# Patient Record
Sex: FEMALE | Race: WHITE | Employment: OTHER | URBAN - METROPOLITAN AREA
[De-identification: names, ages, dates, MRNs, and addresses within clinical notes are randomized per-mention and may not be internally consistent; named-entity substitution may affect disease eponyms.]

---

## 2017-11-13 ENCOUNTER — OP HISTORICAL/CONVERTED ENCOUNTER (OUTPATIENT)
Dept: OTHER | Age: 67
End: 2017-11-13

## 2017-11-17 ENCOUNTER — OP HISTORICAL/CONVERTED ENCOUNTER (OUTPATIENT)
Dept: OTHER | Age: 67
End: 2017-11-17

## 2018-07-23 ENCOUNTER — OP HISTORICAL/CONVERTED ENCOUNTER (OUTPATIENT)
Dept: OTHER | Age: 68
End: 2018-07-23

## 2019-05-24 ENCOUNTER — ED HISTORICAL/CONVERTED ENCOUNTER (OUTPATIENT)
Dept: OTHER | Age: 69
End: 2019-05-24

## 2020-11-18 ENCOUNTER — HOSPITAL ENCOUNTER (OUTPATIENT)
Age: 70
Setting detail: OBSERVATION
Discharge: HOME | End: 2020-11-19
Attending: EMERGENCY MEDICINE | Admitting: INTERNAL MEDICINE
Payer: MEDICARE

## 2020-11-18 ENCOUNTER — APPOINTMENT (OUTPATIENT)
Dept: GENERAL RADIOLOGY | Age: 70
End: 2020-11-18
Attending: EMERGENCY MEDICINE
Payer: MEDICARE

## 2020-11-18 DIAGNOSIS — R07.89 ATYPICAL CHEST PAIN: ICD-10-CM

## 2020-11-18 DIAGNOSIS — R07.9 ACUTE CHEST PAIN: Primary | ICD-10-CM

## 2020-11-18 PROBLEM — J44.9 CHRONIC OBSTRUCTIVE LUNG DISEASE (HCC): Status: ACTIVE | Noted: 2019-10-08

## 2020-11-18 PROBLEM — J30.9 ALLERGIC RHINITIS: Status: ACTIVE | Noted: 2019-10-08

## 2020-11-18 PROBLEM — I49.5 SICK SINUS SYNDROME (HCC): Status: ACTIVE | Noted: 2019-10-08

## 2020-11-18 PROBLEM — E78.5 DYSLIPIDEMIA: Status: ACTIVE | Noted: 2019-10-08

## 2020-11-18 PROBLEM — G47.33 OBSTRUCTIVE SLEEP APNEA OF ADULT: Status: ACTIVE | Noted: 2019-10-08

## 2020-11-18 PROBLEM — I10 ESSENTIAL HYPERTENSION: Status: ACTIVE | Noted: 2019-10-08

## 2020-11-18 PROBLEM — K21.9 GASTROESOPHAGEAL REFLUX DISEASE WITHOUT ESOPHAGITIS: Status: ACTIVE | Noted: 2019-10-08

## 2020-11-18 PROBLEM — E66.9 OBESITY: Status: ACTIVE | Noted: 2019-10-08

## 2020-11-18 LAB
ANION GAP SERPL CALC-SCNC: 2 MMOL/L (ref 5–15)
ANION GAP SERPL CALC-SCNC: 6 MMOL/L (ref 5–15)
APTT PPP: 29.6 SEC (ref 23–35.7)
ATRIAL RATE: 88 BPM
BASOPHILS # BLD: 0 K/UL (ref 0–0.1)
BASOPHILS NFR BLD: 0 % (ref 0–1)
BUN SERPL-MCNC: 12 MG/DL (ref 6–20)
BUN SERPL-MCNC: 9 MG/DL (ref 6–20)
BUN/CREAT SERPL: 16 (ref 12–20)
BUN/CREAT SERPL: 22 (ref 12–20)
CA-I BLD-MCNC: 8.2 MG/DL (ref 8.5–10.1)
CALCULATED P AXIS, ECG09: 68 DEGREES
CALCULATED R AXIS, ECG10: 44 DEGREES
CALCULATED T AXIS, ECG11: 36 DEGREES
CHLORIDE SERPL-SCNC: 111 MMOL/L (ref 97–108)
CHLORIDE SERPL-SCNC: 117 MMOL/L (ref 97–108)
CHOLEST SERPL-MCNC: 122 MG/DL
CO2 SERPL-SCNC: 24 MMOL/L (ref 21–32)
CO2 SERPL-SCNC: 28 MMOL/L (ref 21–32)
CREAT SERPL-MCNC: 0.54 MG/DL (ref 0.55–1.02)
CREAT SERPL-MCNC: 0.57 MG/DL (ref 0.55–1.02)
DIAGNOSIS, 93000: NORMAL
DIFFERENTIAL METHOD BLD: ABNORMAL
EOSINOPHIL # BLD: 0.2 K/UL (ref 0–0.4)
EOSINOPHIL NFR BLD: 2 % (ref 0–7)
ERYTHROCYTE [DISTWIDTH] IN BLOOD BY AUTOMATED COUNT: 14.3 % (ref 11.5–14.5)
ERYTHROCYTE [DISTWIDTH] IN BLOOD BY AUTOMATED COUNT: 14.3 % (ref 11.5–14.5)
GLUCOSE SERPL-MCNC: 90 MG/DL (ref 65–100)
GLUCOSE SERPL-MCNC: 99 MG/DL (ref 65–100)
HCT VFR BLD AUTO: 29.9 % (ref 35–47)
HCT VFR BLD AUTO: 31.9 % (ref 35–47)
HDLC SERPL-MCNC: 23 MG/DL
HDLC SERPL: 5.3 {RATIO} (ref 0–5)
HGB BLD-MCNC: 10 G/DL (ref 11.5–16)
HGB BLD-MCNC: 9.6 G/DL (ref 11.5–16)
IMM GRANULOCYTES # BLD AUTO: 0 K/UL (ref 0–0.04)
IMM GRANULOCYTES NFR BLD AUTO: 0 % (ref 0–0.5)
INR PPP: 1 (ref 0.9–1.1)
LDLC SERPL CALC-MCNC: 47.2 MG/DL (ref 0–100)
LIPID PROFILE,FLP: ABNORMAL
LYMPHOCYTES # BLD: 2.3 K/UL (ref 0.8–3.5)
LYMPHOCYTES NFR BLD: 24 % (ref 12–49)
MAGNESIUM SERPL-MCNC: 2.2 MG/DL (ref 1.6–2.4)
MCH RBC QN AUTO: 32.3 PG (ref 26–34)
MCH RBC QN AUTO: 33 PG (ref 26–34)
MCHC RBC AUTO-ENTMCNC: 31.3 G/DL (ref 30–36.5)
MCHC RBC AUTO-ENTMCNC: 32.1 G/DL (ref 30–36.5)
MCV RBC AUTO: 102.7 FL (ref 80–99)
MCV RBC AUTO: 102.9 FL (ref 80–99)
MONOCYTES # BLD: 0.6 K/UL (ref 0–1)
MONOCYTES NFR BLD: 6 % (ref 5–13)
NEUTS SEG # BLD: 6.3 K/UL (ref 1.8–8)
NEUTS SEG NFR BLD: 68 % (ref 32–75)
P-R INTERVAL, ECG05: 202 MS
PLATELET # BLD AUTO: 292 K/UL (ref 150–400)
PLATELET # BLD AUTO: 302 K/UL (ref 150–400)
PMV BLD AUTO: 9.2 FL (ref 8.9–12.9)
PMV BLD AUTO: 9.4 FL (ref 8.9–12.9)
POTASSIUM SERPL-SCNC: 3.2 MMOL/L (ref 3.5–5.1)
POTASSIUM SERPL-SCNC: 4 MMOL/L (ref 3.5–5.1)
PROTHROMBIN TIME: 12.9 SEC (ref 11.9–14.7)
Q-T INTERVAL, ECG07: 342 MS
QRS DURATION, ECG06: 80 MS
QTC CALCULATION (BEZET), ECG08: 413 MS
RBC # BLD AUTO: 2.91 M/UL (ref 3.8–5.2)
RBC # BLD AUTO: 3.1 M/UL (ref 3.8–5.2)
SODIUM SERPL-SCNC: 141 MMOL/L (ref 136–145)
SODIUM SERPL-SCNC: 147 MMOL/L (ref 136–145)
THERAPEUTIC RANGE,PTTT: NORMAL SEC (ref 68–109)
TRIGL SERPL-MCNC: 259 MG/DL (ref ?–150)
TROPONIN I SERPL-MCNC: <0.05 NG/ML
VENTRICULAR RATE, ECG03: 88 BPM
VLDLC SERPL CALC-MCNC: 51.8 MG/DL
WBC # BLD AUTO: 9.4 K/UL (ref 3.6–11)
WBC # BLD AUTO: 9.9 K/UL (ref 3.6–11)

## 2020-11-18 PROCEDURE — 99218 HC RM OBSERVATION: CPT

## 2020-11-18 PROCEDURE — 76210000002 HC OR PH I REC 3 TO 3.5 HR: Performed by: INTERNAL MEDICINE

## 2020-11-18 PROCEDURE — 96374 THER/PROPH/DIAG INJ IV PUSH: CPT

## 2020-11-18 PROCEDURE — 77030019698 HC SYR ANGI MDLON MRTM -A: Performed by: INTERNAL MEDICINE

## 2020-11-18 PROCEDURE — 71045 X-RAY EXAM CHEST 1 VIEW: CPT

## 2020-11-18 PROCEDURE — 80048 BASIC METABOLIC PNL TOTAL CA: CPT

## 2020-11-18 PROCEDURE — 74011250636 HC RX REV CODE- 250/636: Performed by: EMERGENCY MEDICINE

## 2020-11-18 PROCEDURE — 85610 PROTHROMBIN TIME: CPT

## 2020-11-18 PROCEDURE — 93458 L HRT ARTERY/VENTRICLE ANGIO: CPT | Performed by: INTERNAL MEDICINE

## 2020-11-18 PROCEDURE — 74011250637 HC RX REV CODE- 250/637: Performed by: EMERGENCY MEDICINE

## 2020-11-18 PROCEDURE — 77030029997 HC DEV COM RDL R BND TELE -B: Performed by: INTERNAL MEDICINE

## 2020-11-18 PROCEDURE — 99285 EMERGENCY DEPT VISIT HI MDM: CPT

## 2020-11-18 PROCEDURE — 74011000250 HC RX REV CODE- 250: Performed by: INTERNAL MEDICINE

## 2020-11-18 PROCEDURE — 99153 MOD SED SAME PHYS/QHP EA: CPT | Performed by: INTERNAL MEDICINE

## 2020-11-18 PROCEDURE — C1894 INTRO/SHEATH, NON-LASER: HCPCS | Performed by: INTERNAL MEDICINE

## 2020-11-18 PROCEDURE — 74011250636 HC RX REV CODE- 250/636: Performed by: INTERNAL MEDICINE

## 2020-11-18 PROCEDURE — C1769 GUIDE WIRE: HCPCS | Performed by: INTERNAL MEDICINE

## 2020-11-18 PROCEDURE — 96372 THER/PROPH/DIAG INJ SC/IM: CPT

## 2020-11-18 PROCEDURE — 77030016699 HC CATH ANGI DX INFN1 CARD -A: Performed by: INTERNAL MEDICINE

## 2020-11-18 PROCEDURE — 85027 COMPLETE CBC AUTOMATED: CPT

## 2020-11-18 PROCEDURE — 96375 TX/PRO/DX INJ NEW DRUG ADDON: CPT

## 2020-11-18 PROCEDURE — 85025 COMPLETE CBC W/AUTO DIFF WBC: CPT

## 2020-11-18 PROCEDURE — 80061 LIPID PANEL: CPT

## 2020-11-18 PROCEDURE — 74011250636 HC RX REV CODE- 250/636: Performed by: FAMILY MEDICINE

## 2020-11-18 PROCEDURE — 2709999900 HC NON-CHARGEABLE SUPPLY: Performed by: INTERNAL MEDICINE

## 2020-11-18 PROCEDURE — 85730 THROMBOPLASTIN TIME PARTIAL: CPT

## 2020-11-18 PROCEDURE — 74011000636 HC RX REV CODE- 636: Performed by: INTERNAL MEDICINE

## 2020-11-18 PROCEDURE — 83735 ASSAY OF MAGNESIUM: CPT

## 2020-11-18 PROCEDURE — 93005 ELECTROCARDIOGRAM TRACING: CPT

## 2020-11-18 PROCEDURE — 84484 ASSAY OF TROPONIN QUANT: CPT

## 2020-11-18 PROCEDURE — 36415 COLL VENOUS BLD VENIPUNCTURE: CPT

## 2020-11-18 PROCEDURE — 99152 MOD SED SAME PHYS/QHP 5/>YRS: CPT | Performed by: INTERNAL MEDICINE

## 2020-11-18 PROCEDURE — C1887 CATHETER, GUIDING: HCPCS | Performed by: INTERNAL MEDICINE

## 2020-11-18 PROCEDURE — 74011250637 HC RX REV CODE- 250/637: Performed by: FAMILY MEDICINE

## 2020-11-18 RX ORDER — ATORVASTATIN CALCIUM 10 MG/1
10 TABLET, FILM COATED ORAL
COMMUNITY

## 2020-11-18 RX ORDER — CARVEDILOL 3.12 MG/1
3.12 TABLET ORAL DAILY
Status: DISCONTINUED | OUTPATIENT
Start: 2020-11-18 | End: 2020-11-19 | Stop reason: HOSPADM

## 2020-11-18 RX ORDER — ENOXAPARIN SODIUM 100 MG/ML
40 INJECTION SUBCUTANEOUS DAILY
Status: DISCONTINUED | OUTPATIENT
Start: 2020-11-18 | End: 2020-11-19 | Stop reason: HOSPADM

## 2020-11-18 RX ORDER — EZETIMIBE 10 MG/1
10 TABLET ORAL DAILY
Status: DISCONTINUED | OUTPATIENT
Start: 2020-11-18 | End: 2020-11-19 | Stop reason: HOSPADM

## 2020-11-18 RX ORDER — SODIUM CHLORIDE 0.9 % (FLUSH) 0.9 %
5-40 SYRINGE (ML) INJECTION AS NEEDED
Status: DISCONTINUED | OUTPATIENT
Start: 2020-11-18 | End: 2020-11-19 | Stop reason: HOSPADM

## 2020-11-18 RX ORDER — LOSARTAN POTASSIUM 50 MG/1
50 TABLET ORAL DAILY
Status: DISCONTINUED | OUTPATIENT
Start: 2020-11-18 | End: 2020-11-19 | Stop reason: HOSPADM

## 2020-11-18 RX ORDER — FERROUS SULFATE, DRIED 160(50) MG
1 TABLET, EXTENDED RELEASE ORAL DAILY
COMMUNITY

## 2020-11-18 RX ORDER — ALBUTEROL SULFATE 90 UG/1
2 AEROSOL, METERED RESPIRATORY (INHALATION)
Status: DISCONTINUED | OUTPATIENT
Start: 2020-11-18 | End: 2020-11-19 | Stop reason: HOSPADM

## 2020-11-18 RX ORDER — MIDAZOLAM HYDROCHLORIDE 1 MG/ML
INJECTION, SOLUTION INTRAMUSCULAR; INTRAVENOUS AS NEEDED
Status: DISCONTINUED | OUTPATIENT
Start: 2020-11-18 | End: 2020-11-18 | Stop reason: HOSPADM

## 2020-11-18 RX ORDER — PROMETHAZINE HYDROCHLORIDE 25 MG/1
12.5 TABLET ORAL
Status: DISCONTINUED | OUTPATIENT
Start: 2020-11-18 | End: 2020-11-19 | Stop reason: HOSPADM

## 2020-11-18 RX ORDER — ALBUTEROL SULFATE 0.83 MG/ML
3 SOLUTION RESPIRATORY (INHALATION)
COMMUNITY

## 2020-11-18 RX ORDER — ATORVASTATIN CALCIUM 40 MG/1
10 TABLET, FILM COATED ORAL
COMMUNITY
End: 2020-11-18

## 2020-11-18 RX ORDER — NITROGLYCERIN 0.4 MG/1
0.4 TABLET SUBLINGUAL AS NEEDED
COMMUNITY

## 2020-11-18 RX ORDER — ACETAMINOPHEN 325 MG/1
650 TABLET ORAL
Status: DISCONTINUED | OUTPATIENT
Start: 2020-11-18 | End: 2020-11-18

## 2020-11-18 RX ORDER — HEPARIN SODIUM 200 [USP'U]/100ML
INJECTION, SOLUTION INTRAVENOUS
Status: COMPLETED | OUTPATIENT
Start: 2020-11-18 | End: 2020-11-18

## 2020-11-18 RX ORDER — POTASSIUM CHLORIDE 20 MEQ/1
40 TABLET, EXTENDED RELEASE ORAL ONCE
Status: COMPLETED | OUTPATIENT
Start: 2020-11-18 | End: 2020-11-18

## 2020-11-18 RX ORDER — POTASSIUM CHLORIDE 750 MG/1
30 TABLET, FILM COATED, EXTENDED RELEASE ORAL ONCE
Status: DISCONTINUED | OUTPATIENT
Start: 2020-11-18 | End: 2020-11-18

## 2020-11-18 RX ORDER — NITROGLYCERIN 5 MG/ML
INJECTION, SOLUTION INTRAVENOUS AS NEEDED
Status: DISCONTINUED | OUTPATIENT
Start: 2020-11-18 | End: 2020-11-18 | Stop reason: HOSPADM

## 2020-11-18 RX ORDER — SODIUM CHLORIDE 9 MG/ML
INJECTION, SOLUTION INTRAVENOUS
Status: COMPLETED | OUTPATIENT
Start: 2020-11-18 | End: 2020-11-18

## 2020-11-18 RX ORDER — GUAIFENESIN 100 MG/5ML
81 LIQUID (ML) ORAL DAILY
Status: DISCONTINUED | OUTPATIENT
Start: 2020-11-19 | End: 2020-11-19 | Stop reason: HOSPADM

## 2020-11-18 RX ORDER — ONDANSETRON 2 MG/ML
4 INJECTION INTRAMUSCULAR; INTRAVENOUS
Status: COMPLETED | OUTPATIENT
Start: 2020-11-18 | End: 2020-11-18

## 2020-11-18 RX ORDER — VERAPAMIL HYDROCHLORIDE 2.5 MG/ML
INJECTION, SOLUTION INTRAVENOUS AS NEEDED
Status: DISCONTINUED | OUTPATIENT
Start: 2020-11-18 | End: 2020-11-18 | Stop reason: HOSPADM

## 2020-11-18 RX ORDER — FENTANYL CITRATE 50 UG/ML
INJECTION, SOLUTION INTRAMUSCULAR; INTRAVENOUS AS NEEDED
Status: DISCONTINUED | OUTPATIENT
Start: 2020-11-18 | End: 2020-11-18 | Stop reason: HOSPADM

## 2020-11-18 RX ORDER — DIPHENHYDRAMINE HYDROCHLORIDE 50 MG/ML
50 INJECTION, SOLUTION INTRAMUSCULAR; INTRAVENOUS
Status: DISCONTINUED | OUTPATIENT
Start: 2020-11-18 | End: 2020-11-19 | Stop reason: HOSPADM

## 2020-11-18 RX ORDER — PANTOPRAZOLE SODIUM 40 MG/1
1 TABLET, DELAYED RELEASE ORAL DAILY
COMMUNITY

## 2020-11-18 RX ORDER — OMEGA-3-ACID ETHYL ESTERS 1 G/1
1 CAPSULE, LIQUID FILLED ORAL 2 TIMES DAILY
COMMUNITY

## 2020-11-18 RX ORDER — CLOPIDOGREL BISULFATE 75 MG/1
75 TABLET ORAL DAILY
COMMUNITY
End: 2020-11-18 | Stop reason: ALTCHOICE

## 2020-11-18 RX ORDER — POLYETHYLENE GLYCOL 3350 17 G/17G
17 POWDER, FOR SOLUTION ORAL DAILY PRN
Status: DISCONTINUED | OUTPATIENT
Start: 2020-11-18 | End: 2020-11-19 | Stop reason: HOSPADM

## 2020-11-18 RX ORDER — SODIUM CHLORIDE 0.9 % (FLUSH) 0.9 %
5-40 SYRINGE (ML) INJECTION EVERY 8 HOURS
Status: CANCELLED | OUTPATIENT
Start: 2020-11-18

## 2020-11-18 RX ORDER — POTASSIUM CHLORIDE 20 MEQ/1
40 TABLET, EXTENDED RELEASE ORAL
Status: ACTIVE | OUTPATIENT
Start: 2020-11-18 | End: 2020-11-18

## 2020-11-18 RX ORDER — SODIUM CHLORIDE 0.9 % (FLUSH) 0.9 %
5-40 SYRINGE (ML) INJECTION AS NEEDED
Status: CANCELLED | OUTPATIENT
Start: 2020-11-18

## 2020-11-18 RX ORDER — IBUPROFEN 200 MG
1 TABLET ORAL DAILY PRN
Status: DISCONTINUED | OUTPATIENT
Start: 2020-11-18 | End: 2020-11-19 | Stop reason: HOSPADM

## 2020-11-18 RX ORDER — OLMESARTAN MEDOXOMIL 20 MG/1
10 TABLET ORAL DAILY
COMMUNITY

## 2020-11-18 RX ORDER — IPRATROPIUM BROMIDE 0.5 MG/2.5ML
500 SOLUTION RESPIRATORY (INHALATION)
Status: DISCONTINUED | OUTPATIENT
Start: 2020-11-18 | End: 2020-11-19 | Stop reason: HOSPADM

## 2020-11-18 RX ORDER — ATORVASTATIN CALCIUM 10 MG/1
10 TABLET, FILM COATED ORAL
Status: DISCONTINUED | OUTPATIENT
Start: 2020-11-18 | End: 2020-11-19 | Stop reason: HOSPADM

## 2020-11-18 RX ORDER — DIPHENHYDRAMINE HYDROCHLORIDE 50 MG/ML
INJECTION, SOLUTION INTRAMUSCULAR; INTRAVENOUS AS NEEDED
Status: DISCONTINUED | OUTPATIENT
Start: 2020-11-18 | End: 2020-11-18 | Stop reason: HOSPADM

## 2020-11-18 RX ORDER — CARVEDILOL 3.12 MG/1
3.12 TABLET ORAL DAILY
COMMUNITY

## 2020-11-18 RX ORDER — ONDANSETRON 2 MG/ML
4 INJECTION INTRAMUSCULAR; INTRAVENOUS
Status: DISCONTINUED | OUTPATIENT
Start: 2020-11-18 | End: 2020-11-19 | Stop reason: HOSPADM

## 2020-11-18 RX ORDER — GUAIFENESIN 100 MG/5ML
243 LIQUID (ML) ORAL ONCE
Status: ACTIVE | OUTPATIENT
Start: 2020-11-18 | End: 2020-11-18

## 2020-11-18 RX ORDER — MORPHINE SULFATE 2 MG/ML
4 INJECTION, SOLUTION INTRAMUSCULAR; INTRAVENOUS
Status: DISCONTINUED | OUTPATIENT
Start: 2020-11-18 | End: 2020-11-19 | Stop reason: HOSPADM

## 2020-11-18 RX ORDER — CLOPIDOGREL BISULFATE 75 MG/1
75 TABLET ORAL DAILY
Status: DISCONTINUED | OUTPATIENT
Start: 2020-11-19 | End: 2020-11-19 | Stop reason: HOSPADM

## 2020-11-18 RX ORDER — MORPHINE SULFATE 4 MG/ML
2 INJECTION INTRAVENOUS ONCE
Status: COMPLETED | OUTPATIENT
Start: 2020-11-18 | End: 2020-11-18

## 2020-11-18 RX ORDER — ACETAMINOPHEN 650 MG/1
650 SUPPOSITORY RECTAL
Status: DISCONTINUED | OUTPATIENT
Start: 2020-11-18 | End: 2020-11-18

## 2020-11-18 RX ORDER — CLOPIDOGREL BISULFATE 75 MG/1
75 TABLET ORAL DAILY
Status: DISCONTINUED | OUTPATIENT
Start: 2020-11-18 | End: 2020-11-18

## 2020-11-18 RX ORDER — SODIUM CHLORIDE 0.9 % (FLUSH) 0.9 %
5-40 SYRINGE (ML) INJECTION EVERY 8 HOURS
Status: DISCONTINUED | OUTPATIENT
Start: 2020-11-18 | End: 2020-11-19 | Stop reason: HOSPADM

## 2020-11-18 RX ORDER — NITROGLYCERIN 0.4 MG/1
0.4 TABLET SUBLINGUAL AS NEEDED
Status: DISCONTINUED | OUTPATIENT
Start: 2020-11-18 | End: 2020-11-19 | Stop reason: HOSPADM

## 2020-11-18 RX ORDER — EZETIMIBE 10 MG/1
10 TABLET ORAL DAILY
COMMUNITY
Start: 2020-02-28

## 2020-11-18 RX ORDER — HEPARIN SODIUM 1000 [USP'U]/ML
INJECTION, SOLUTION INTRAVENOUS; SUBCUTANEOUS AS NEEDED
Status: DISCONTINUED | OUTPATIENT
Start: 2020-11-18 | End: 2020-11-18 | Stop reason: HOSPADM

## 2020-11-18 RX ORDER — CALCIUM CARBONATE/VITAMIN D3 600MG-5MCG
1 TABLET ORAL DAILY
Status: DISCONTINUED | OUTPATIENT
Start: 2020-11-18 | End: 2020-11-19 | Stop reason: HOSPADM

## 2020-11-18 RX ORDER — SODIUM CHLORIDE 9 MG/ML
50 INJECTION, SOLUTION INTRAVENOUS CONTINUOUS
Status: DISPENSED | OUTPATIENT
Start: 2020-11-18 | End: 2020-11-18

## 2020-11-18 RX ORDER — LIDOCAINE HYDROCHLORIDE 10 MG/ML
INJECTION INFILTRATION; PERINEURAL AS NEEDED
Status: DISCONTINUED | OUTPATIENT
Start: 2020-11-18 | End: 2020-11-18 | Stop reason: HOSPADM

## 2020-11-18 RX ORDER — ALBUTEROL SULFATE 90 UG/1
2 AEROSOL, METERED RESPIRATORY (INHALATION)
COMMUNITY

## 2020-11-18 RX ADMIN — FAMOTIDINE 20 MG: 10 INJECTION INTRAVENOUS at 09:36

## 2020-11-18 RX ADMIN — Medication 1 TABLET: at 09:35

## 2020-11-18 RX ADMIN — EZETIMIBE 10 MG: 10 TABLET ORAL at 09:36

## 2020-11-18 RX ADMIN — ONDANSETRON 4 MG: 2 INJECTION INTRAMUSCULAR; INTRAVENOUS at 04:23

## 2020-11-18 RX ADMIN — MORPHINE SULFATE 2 MG: 4 INJECTION INTRAVENOUS at 04:23

## 2020-11-18 RX ADMIN — FAMOTIDINE 20 MG: 10 INJECTION INTRAVENOUS at 21:46

## 2020-11-18 RX ADMIN — Medication 10 ML: at 21:47

## 2020-11-18 RX ADMIN — ENOXAPARIN SODIUM 40 MG: 40 INJECTION SUBCUTANEOUS at 09:35

## 2020-11-18 RX ADMIN — POTASSIUM CHLORIDE 40 MEQ: 1500 TABLET, EXTENDED RELEASE ORAL at 04:23

## 2020-11-18 RX ADMIN — CLOPIDOGREL BISULFATE 75 MG: 75 TABLET ORAL at 09:36

## 2020-11-18 RX ADMIN — ATORVASTATIN CALCIUM 10 MG: 10 TABLET, FILM COATED ORAL at 21:46

## 2020-11-18 NOTE — ED TRIAGE NOTES
Pt started having chest pain from 5pm yesterday, took nitro it eased a little but when she laid down it got worse.  Pt had a stent 3 yrs ago

## 2020-11-18 NOTE — Clinical Note
TRANSFER - OUT REPORT:     Verbal report given to: Javan Camara. Report consisted of patient's Situation, Background, Assessment and   Recommendations(SBAR). Opportunity for questions and clarification was provided. Patient transported with a Registered Nurse. Patient transported to: PACU.

## 2020-11-18 NOTE — PROGRESS NOTES
Four eyes skin assessment completed with Sondra Ashley RN. Purple bruise on R lower abd. And R thigh. Ecchymotic bruise on L and R forearm.

## 2020-11-18 NOTE — PROGRESS NOTES
Dr. Margaret Casarez to bedside and discussed plan of care with pt., assessed site, pt. May go to medical unit with remote tele. Once band off and hemostasis achieved.

## 2020-11-18 NOTE — CONSULTS
Consult    NAME: Dany Bynum   :  1950   MRN:  [id number]     Date/Time:  2020 11:37 AM    Patient PCP: Unknown, Provider  ________________________________________________________________________     Assessment:     1. Acute coronary syndrome  2. Coronary  artery disease status post stenting  3. Essential hypertension,  4. Hyperlipidemia  5. Status post GYN surgery 3 weeks ago  6. Chronic anemia  7. Tobacco addiction  8. history of permanent pacemaker        Plan:     1. Patient has atypical angina to her previous episode before stenting. Troponin is negative. EKG shows old anterior wall MI pattern. I discussed the option of cardiac catheterization versus Lexiscan Cardiolite patient. I also discussed with Dr. Jazmyne Brandt who is her primary cardiologist.  Since her chest pain is similar to angina and is a recurring at rest he felt cardiac catheterization is preferable. I discussed this option with the patient and she agrees and will try to do it today afternoon. 2. Patient is already on aspirin Plavix and statin and beta-blocker. []        High complexity decision making was performed        Subjective:   CHIEF COMPLAINT:     HISTORY OF PRESENT ILLNESS:     Prosper Baumann is a 81 y.o.  female who is known to have coronary artery disease, status post stenting was admitted to the hospital with recurrent substernal chest pain radiating to left arm partially relieved by nitroglycerin. Had a recurrent chest pain at rest.  When I saw her in the emergency room her chest pain has resolved. But she feels weak. Her initial cardiac enzymes are negative. Patient had GYN surgery for prolapse of bladder 20 days ago. She is a smoker and continues to smoke about 1 pack/day. Suffers from hypertension, hypercholesterolemia and coronary artery disease. Denies any nausea or vomiting. Denies any tender chest wall. Denies any melena. She is compliant with her medications.       We were asked to consult for work up and evaluation of the above problems.      Past Medical History:   Diagnosis Date    Chronic anemia     Chronic obstructive pulmonary disease (HCC)     GERD (gastroesophageal reflux disease)     High cholesterol     Hypertension     Hypertension     Obesity     CURT (obstructive sleep apnea)     Sick sinus syndrome (HCC)       Past Surgical History:   Procedure Laterality Date    HX AAA REPAIR      HX CHOLECYSTECTOMY      HX PACEMAKER      HX TUMOR REMOVAL      Right neck, benign results     Allergies   Allergen Reactions    Lipitor [Atorvastatin] Unknown (comments)     Pt can take Crestor    Niacin Unknown (comments)    Statins-Hmg-Coa Reductase Inhibitors Unknown (comments)    Tape [Adhesive] Unknown (comments)    Tricor [Fenofibrate Micronized] Unknown (comments)    Varenicline Unknown (comments)     Other reaction(s): Chest Pain      Meds:  See below  Social History     Tobacco Use    Smoking status: Current Every Day Smoker     Packs/day: 1.00    Smokeless tobacco: Never Used   Substance Use Topics    Alcohol use: Not Currently     Comment: no      Family History   Problem Relation Age of Onset    Heart Failure Father     Coronary Artery Disease Brother        REVIEW OF SYSTEMS:     []         Unable to obtain  ROS due to ---   [x]         Total of 12 systems reviewed as follows:    Constitutional: negative fever, negative chills, negative weight loss  Eyes:   negative visual changes  ENT:   negative sore throat, tongue or lip swelling  Respiratory:  negative cough, negative dyspnea  Cards:  negative for chest pain, palpitations, lower extremity edema  GI:   negative for nausea, vomiting, diarrhea, and abdominal pain  Genitourinary: negative for frequency, dysuria  Integument:  negative for rash   Hematologic:  negative for easy bruising and gum/nose bleeding  Musculoskel: negative for myalgias,  back pain  Neurological:  negative for headaches, dizziness, vertigo, weakness  Behavl/Psych: negative for feelings of anxiety, depression     Pertinent Positives include :    Objective:      Physical Exam:    Last 24hrs VS reviewed since prior progress note. Most recent are:    Visit Vitals  /72 (BP 1 Location: Left arm)   Pulse 77   Temp 98.2 °F (36.8 °C)   Resp 18   Ht 5' 7\" (1.702 m)   Wt 67.1 kg (148 lb)   SpO2 96%   BMI 23.18 kg/m²     No intake or output data in the 24 hours ending 11/18/20 1137     General Appearance: Well developed, well nourished, alert & oriented x 3,    no acute distress. Ears/Nose/Mouth/Throat: Pupils equal and round, Hearing grossly normal.  Neck: Supple. JVP within normal limits. Carotids good upstrokes, with no bruit. Chest: Lungs clear to auscultation bilaterally. Cardiovascular: Regular rate and rhythm, S1S2 normal, no murmur, rubs, gallops. Abdomen: Soft, non-tender, bowel sounds are active. No organomegaly. Extremities: No edema bilaterally. Femoral pulses +2, Distal Pulses +1. Skin: Warm and dry. Neuro: No neurological deficit[]         Post-cath site without hematoma, bruit, tenderness, or thrill. Distal pulses intact.     Data:      I have reviewed vital signs,labs and ekg independently    Telemetry:    EKG:     EKG RESULTS     Procedure Component Value Units Date/Time    EKG, 12 LEAD, INITIAL [[id number]] Collected:  11/18/20 0242    Order Status:  Completed Updated:  11/18/20 0944     Ventricular Rate 88 BPM      Atrial Rate 88 BPM      P-R Interval 202 ms      QRS Duration 80 ms      Q-T Interval 342 ms      QTC Calculation (Bezet) 413 ms      Calculated P Axis 68 degrees      Calculated R Axis 44 degrees      Calculated T Axis 36 degrees      Diagnosis --     Normal sinus rhythm  Cannot rule out Anterior infarct (cited on or before 19-MAY-2013)  Abnormal ECG  When compared with ECG of 19-MAY-2013 12:09,  No significant change was found  Confirmed by NEIDA JIMENEZ MD ([id number]) on 11/18/2020 9:44:03 AM             XR CHEST SNGL V Final Result   IMPRESSION: No acute cardiopulmonary abnormality. .         Echo Results  (Last 48 hours)    None        Cardiolite (Tc-99m Sestamibi) stress test    XR Results (most recent):     Results from Hospital Encounter encounter on 11/18/20   XR CHEST SNGL V    Narrative HISTORY:  CP    TECHNIQUE:  AP chest radiograph    COMPARISON: 2/20/2013  LIMITATIONS: None    TUBES/LINES: Dual-lead left chest wall AICD appears in place    LUNG PARENCHYMA: Normal  TRACHEA/BRONCHI: Normal  PULMONARY VESSELS: Normal  PLEURA: Normal  HEART: Normal  AORTIC SHADOW: Calcified aortic knob noted otherwise unremarkable. MEDIASTINUM: Normal  BONE/SOFT TISSUES: No acute abnormality. OTHER: None      Impression IMPRESSION: No acute cardiopulmonary abnormality. .         Prior to Admission medications    Medication Sig Start Date End Date Taking? Authorizing Provider   ezetimibe (ZETIA) 10 mg tablet Take 10 mg by mouth daily. 2/28/20  Yes Provider, Historical   atorvastatin (Lipitor) 10 mg tablet Take 10 mg by mouth nightly. Yes Provider, Historical   clopidogreL (PLAVIX) 75 mg tab Take 75 mg by mouth daily. Provider, Historical   carvediloL (COREG) 3.125 mg tablet Take 3.125 mg by mouth daily. Provider, Historical   nitroglycerin (NITROSTAT) 0.4 mg SL tablet 0.4 mg by SubLINGual route as needed for Chest Pain. Provider, Historical   olmesartan (BENICAR) 20 mg tablet Take 20 mg by mouth daily. Provider, Historical   albuterol (ProAir HFA) 90 mcg/actuation inhaler Take 2 Puffs by inhalation every six (6) hours as needed for Shortness of Breath. Provider, Historical   albuterol (PROVENTIL VENTOLIN) 2.5 mg /3 mL (0.083 %) nebu Take 3 mL by inhalation every six (6) hours as needed for Shortness of Breath. Provider, Historical   calcium-vitamin D (Os-Carlos 500+D) 500 mg-200 unit per tablet Take 1 Tab by mouth daily.     Provider, Historical   omega-3 acid ethyl esters (LOVAZA) 1 gram capsule Take 1 Cap by mouth two (2) times a day. Provider, Historical   tiotropium (Spiriva with HandiHaler) 18 mcg inhalation capsule Take 1 Inhalation by mouth daily. Provider, Historical   pantoprazole (PROTONIX) 40 mg tablet Take 1 Tab by mouth daily. Provider, Historical       Recent Results (from the past 24 hour(s))   EKG, 12 LEAD, INITIAL    Collection Time: 11/18/20  2:42 AM   Result Value Ref Range    Ventricular Rate 88 BPM    Atrial Rate 88 BPM    P-R Interval 202 ms    QRS Duration 80 ms    Q-T Interval 342 ms    QTC Calculation (Bezet) 413 ms    Calculated P Axis 68 degrees    Calculated R Axis 44 degrees    Calculated T Axis 36 degrees    Diagnosis       Normal sinus rhythm  Cannot rule out Anterior infarct (cited on or before 19-MAY-2013)  Abnormal ECG  When compared with ECG of 19-MAY-2013 12:09,  No significant change was found  Confirmed by NEIDA HENNESSY MD ([id number]) on 11/18/2020 9:44:03 AM     CBC WITH AUTOMATED DIFF    Collection Time: 11/18/20  3:10 AM   Result Value Ref Range    WBC 9.4 3.6 - 11.0 K/uL    RBC 2.91 (L) 3.80 - 5.20 M/uL    HGB 9.6 (L) 11.5 - 16.0 g/dL    HCT 29.9 (L) 35.0 - 47.0 %    .7 (H) 80.0 - 99.0 FL    MCH 33.0 26.0 - 34.0 PG    MCHC 32.1 30.0 - 36.5 g/dL    RDW 14.3 11.5 - 14.5 %    PLATELET 861 108 - 568 K/uL    MPV 9.4 8.9 - 12.9 FL    NEUTROPHILS 68 32 - 75 %    LYMPHOCYTES 24 12 - 49 %    MONOCYTES 6 5 - 13 %    EOSINOPHILS 2 0 - 7 %    BASOPHILS 0 0 - 1 %    IMMATURE GRANULOCYTES 0 0.0 - 0.5 %    ABS. NEUTROPHILS 6.3 1.8 - 8.0 K/UL    ABS. LYMPHOCYTES 2.3 0.8 - 3.5 K/UL    ABS. MONOCYTES 0.6 0.0 - 1.0 K/UL    ABS. EOSINOPHILS 0.2 0.0 - 0.4 K/UL    ABS. BASOPHILS 0.0 0.0 - 0.1 K/UL    ABS. IMM.  GRANS. 0.0 0.00 - 0.04 K/UL    DF AUTOMATED     METABOLIC PANEL, BASIC    Collection Time: 11/18/20  3:10 AM   Result Value Ref Range    Sodium 147 (H) 136 - 145 mmol/L    Potassium 3.2 (L) 3.5 - 5.1 mmol/L    Chloride 117 (H) 97 - 108 mmol/L    CO2 24 21 - 32 mmol/L    Anion gap 6 5 - 15 mmol/L    Glucose 99 65 - 100 mg/dL    BUN 12 6 - 20 mg/dL    Creatinine 0.54 (L) 0.55 - 1.02 mg/dL    BUN/Creatinine ratio 22 (H) 12 - 20      GFR est AA >60 >60 ml/min/1.73m2    GFR est non-AA >60 >60 ml/min/1.73m2    Calcium PENDING mg/dL   PROTHROMBIN TIME + INR    Collection Time: 11/18/20  3:10 AM   Result Value Ref Range    Prothrombin time 12.9 11.9 - 14.7 sec    INR 1.0 0.9 - 1.1     PTT    Collection Time: 11/18/20  3:10 AM   Result Value Ref Range    aPTT 29.6 23.0 - 35.7 sec    aPTT, therapeutic range   68 - 109 sec   TROPONIN I    Collection Time: 11/18/20  3:10 AM   Result Value Ref Range    Troponin-I, Qt. <0.05 <0.05 ng/mL      Right radial pulse is =/-left radial pulse is 2+. Patient understand the risk and benefits of cardiac catheterization namely injury to blood vessels myocardial infarction CVA at arate of 1/1000 degrees.   Scheduled for 3 PM today  Azalia Rizo MD    Patient PCP: Unknown, Provider  Jh Pearce MD

## 2020-11-18 NOTE — H&P
History and Physical    Patient: Kirt Aaron MRN: [id number]  SSN: [id number]    YOB: 1950  Age: 81 y.o. Sex: female      Subjective:      Chief Complaint: Chest pain    HPI: Kirt Aaron is a 81 y.o. female with past medical history of coronary artery disease, hypertension, hyperlipidemia and chronic anemia presenting to the ER with complaints of chest pain. Around 5 PM while watching TV, Ms. Fiorella Melvin developed chest pain located in the center of her chest with radiation to her left chest and left arm. Ms. Fiorella Melvin describes pain as a \"dull, deep, aching, squeezing pain\". She denies shortness of breath, diaphoresis, nausea, vomiting or palpitations. After approximately 30 minutes, Ms. Fiorella Melvin took a sublingual nitroglycerin with some improvement in chest pain. Pain continue to wax and wane throughout the evening. At approximately 8 PM, Ms. Fiorella Melvin had increased fatigue - she tried to lay down and sleep however could not get comfortable. Around 1230 this morning, she took a second sublingual nitroglycerin without improvement in chest pain. Persistent chest pain prompted ER visit this morning. Daily dose of plavix and aspirin were taken as prescribed last evening approximately 4 hours prior to presentation. On arrival to the ER, temperature was 97.9 °F, pulse 84, respirations 14, blood pressure 101/67 and oxygen saturation 98% on room air. Initial EKG has normal sinus rhythm with nonspecific ST and T wave changes, rate 88 bpm.  Initial troponin is less than 0.05 and chest x-ray has no evidence of acute cardiopulmonary process. Abnormal laboratory work includes hemoglobin 9.6 (baseline) and potassium level is decreased with a value of 3.2. Hospital service has been asked to admit Ms. Fiorella Melvin for further treatment and evaluation of chest pain. Ms. Fiorella Melvin is a full code. She smokes 1 pack of cigarettes per day. She lives with her  Orville Larsen who can be reached at [phone].   Past medical history, past surgical history, home medications, family history and social history was reviewed at the time of admission. Past Medical History:   Diagnosis Date    Chronic anemia     Chronic obstructive pulmonary disease (HCC)     GERD (gastroesophageal reflux disease)     High cholesterol     Hypertension     Hypertension     Obesity     CURT (obstructive sleep apnea)     Sick sinus syndrome (HCC)      Past Surgical History:   Procedure Laterality Date    HX AAA REPAIR      HX CHOLECYSTECTOMY      HX PACEMAKER      HX TUMOR REMOVAL      Right neck, benign results      Family History   Problem Relation Age of Onset    Heart Failure Father     Coronary Artery Disease Brother      Social History     Tobacco Use    Smoking status: Current Every Day Smoker     Packs/day: 1.00    Smokeless tobacco: Never Used   Substance Use Topics    Alcohol use: Not Currently     Comment: no      Prior to Admission medications    Medication Sig Start Date End Date Taking? Authorizing Provider   ezetimibe (ZETIA) 10 mg tablet Take 10 mg by mouth daily. 2/28/20  Yes Provider, Historical   atorvastatin (Lipitor) 10 mg tablet Take 10 mg by mouth nightly. Yes Provider, Historical   clopidogreL (PLAVIX) 75 mg tab Take 75 mg by mouth daily. Provider, Historical   carvediloL (COREG) 3.125 mg tablet Take 3.125 mg by mouth daily. Provider, Historical   nitroglycerin (NITROSTAT) 0.4 mg SL tablet 0.4 mg by SubLINGual route as needed for Chest Pain. Provider, Historical   olmesartan (BENICAR) 20 mg tablet Take 20 mg by mouth daily. Provider, Historical   albuterol (ProAir HFA) 90 mcg/actuation inhaler Take 2 Puffs by inhalation every six (6) hours as needed for Shortness of Breath. Provider, Historical   albuterol (PROVENTIL VENTOLIN) 2.5 mg /3 mL (0.083 %) nebu Take 3 mL by inhalation every six (6) hours as needed for Shortness of Breath.     Provider, Historical   calcium-vitamin D (Os-Carlos 500+D) 500 mg-200 unit per tablet Take 1 Tab by mouth daily. Provider, Historical   omega-3 acid ethyl esters (LOVAZA) 1 gram capsule Take 1 Cap by mouth two (2) times a day. Provider, Historical   tiotropium (Spiriva with HandiHaler) 18 mcg inhalation capsule Take 1 Inhalation by mouth daily. Provider, Historical   pantoprazole (PROTONIX) 40 mg tablet Take 1 Tab by mouth daily. Provider, Historical        Allergies   Allergen Reactions    Lipitor [Atorvastatin] Unknown (comments)     Pt can take Crestor    Niacin Unknown (comments)    Statins-Hmg-Coa Reductase Inhibitors Unknown (comments)    Tape [Adhesive] Unknown (comments)    Tricor [Fenofibrate Micronized] Unknown (comments)    Varenicline Unknown (comments)     Other reaction(s): Chest Pain       Review of Systems:  Constitutional: Denies fevers, chills, fatigue, weakness, unexplained weight loss, night sweats. Head, Eyes, Ears, Nose, Mouth, Throat: Denies nasal congestion, sore throat, rhinorrhea, earache, ringing of the ears, difficulty hearing, facial pain, facial swelling. Respiratory: Denies shortness of breath, wheezing, cough, sputum production, hemoptysis. Cardiovascular: Positive for chest pain. No irregular heart beat, racing pulse, lower extremity edema, dizziness, dyspnea on exertion, orthopnea. Gastrointestinal: No nausea, vomiting, diarrhea, constipation, abdominal pain, loss of appetite, acid reflux, melena, hematochezia, change in bowel habits. Endocrine: Denies intolerance to heat or cold. Denies polyuria, polydipsia, polyphagia. Denies recent weigh changes. Genitourinary: No increased urinary frequency, dysuria, hematuria, urinary incontinence, increased urinary frequency. Integument/Breast: No rash, itching or new skin lesions. Musculoskeletal: No joint swelling, joint pain, myalgias, neck pain, back pain.   Neurological: No headaches, dizziness, confusion, tremors, numbness/tingling, paresthesias, weakness, problems with balance, loss of consciousness. Hematologic: Denies easy bleeding, easy bruising, lymphadenopathy. Behavioral/Psychiatric: Denies anxiety, depression, increased irritability, mood swings, delusions, hallucination, SI/HI. Objective:     Vitals:    11/18/20 0249 11/18/20 0427   BP: 101/67 121/62   Pulse: 84 81   Resp: 14 14   Temp: 97.9 °F (36.6 °C)    SpO2: 98% 99%   Weight: 67.1 kg (148 lb)    Height: 5' 7\" (1.702 m)         Physical Exam:  General: Alert and Oriented x 3. Currently denies chest pain. Cooperative and friendly. No acute distress. Obese and well developed. HEAD, EYES: Normocephalic, atraumatic, EOMI, PERRLA. Nose: Turbinates within normal limits, No drainage. Throat and Neck: Posterior pharynx without erythema or exudate. No masses, JVD, thyromegaly or lymphadenopathy appreciated. Cervical spine has good range of motion without pain. Lungs: Clear to auscultation bilaterally without wheezes, rhonchi or crackles. Good air movement bilaterally. Symmetric chest rise with respirations. Heart/Chest: Regular rate and rhythm. Normal S1/S2. No appreciated murmurs, rubs or gallops. Chest pain is not reproducible with palpation over anterior chest wall. No lower extremity edema. Pacemaker site is clean, dry and intact. Abdomen: Soft, non-tender, non-distended. Bowel sounds present in all four quadrants. No masses appreciated. Extremities:  Atraumatic. Able to move all extremities symmetrically. No abnormal bony protuberances appreciated. Back: No pain with palpation over spinous processes or paraspinal musculature. No CVA tenderness. Skin: Clean, dry and intact without appreciated lesions. Neurologic: A&Ox3. Cranial nerves 2-12 are grossly intact. Intact sensation. 5/5 motor strength in all 4 extremities. No focal deficits. Psychiatric: Normal affect, normal thought process, good eye contact.      Recent Results (from the past 24 hour(s))   CBC WITH AUTOMATED DIFF    Collection Time: 11/18/20  3:10 AM   Result Value Ref Range    WBC 9.4 3.6 - 11.0 K/uL    RBC 2.91 (L) 3.80 - 5.20 M/uL    HGB 9.6 (L) 11.5 - 16.0 g/dL    HCT 29.9 (L) 35.0 - 47.0 %    .7 (H) 80.0 - 99.0 FL    MCH 33.0 26.0 - 34.0 PG    MCHC 32.1 30.0 - 36.5 g/dL    RDW 14.3 11.5 - 14.5 %    PLATELET 905 494 - 629 K/uL    MPV 9.4 8.9 - 12.9 FL    NEUTROPHILS 68 32 - 75 %    LYMPHOCYTES 24 12 - 49 %    MONOCYTES 6 5 - 13 %    EOSINOPHILS 2 0 - 7 %    BASOPHILS 0 0 - 1 %    IMMATURE GRANULOCYTES 0 0.0 - 0.5 %    ABS. NEUTROPHILS 6.3 1.8 - 8.0 K/UL    ABS. LYMPHOCYTES 2.3 0.8 - 3.5 K/UL    ABS. MONOCYTES 0.6 0.0 - 1.0 K/UL    ABS. EOSINOPHILS 0.2 0.0 - 0.4 K/UL    ABS. BASOPHILS 0.0 0.0 - 0.1 K/UL    ABS. IMM. GRANS. 0.0 0.00 - 0.04 K/UL    DF AUTOMATED     METABOLIC PANEL, BASIC    Collection Time: 11/18/20  3:10 AM   Result Value Ref Range    Sodium 147 (H) 136 - 145 mmol/L    Potassium 3.2 (L) 3.5 - 5.1 mmol/L    Chloride 117 (H) 97 - 108 mmol/L    CO2 24 21 - 32 mmol/L    Anion gap 6 5 - 15 mmol/L    Glucose 99 65 - 100 mg/dL    BUN 12 6 - 20 mg/dL    Creatinine 0.54 (L) 0.55 - 1.02 mg/dL    BUN/Creatinine ratio 22 (H) 12 - 20      GFR est AA >60 >60 ml/min/1.73m2    GFR est non-AA >60 >60 ml/min/1.73m2    Calcium PENDING mg/dL   PROTHROMBIN TIME + INR    Collection Time: 11/18/20  3:10 AM   Result Value Ref Range    Prothrombin time 12.9 11.9 - 14.7 sec    INR 1.0 0.9 - 1.1     PTT    Collection Time: 11/18/20  3:10 AM   Result Value Ref Range    aPTT 29.6 23.0 - 35.7 sec    aPTT, therapeutic range   68 - 109 sec   TROPONIN I    Collection Time: 11/18/20  3:10 AM   Result Value Ref Range    Troponin-I, Qt. <0.05 <0.05 ng/mL       XR Results (maximum last 3):   Results from [place] encounter on 11/18/20   XR CHEST SNGL V    Narrative HISTORY:  CP    TECHNIQUE:  AP chest radiograph    COMPARISON: 2/20/2013  LIMITATIONS: None    TUBES/LINES: Dual-lead left chest wall AICD appears in place    LUNG PARENCHYMA: Normal  TRACHEA/BRONCHI: Normal  PULMONARY VESSELS: Normal  PLEURA: Normal  HEART: Normal  AORTIC SHADOW: Calcified aortic knob noted otherwise unremarkable. MEDIASTINUM: Normal  BONE/SOFT TISSUES: No acute abnormality. OTHER: None      Impression IMPRESSION: No acute cardiopulmonary abnormality. .        Assessment:     Yu Miller is a 81 y.o. female with past medical history of coronary artery disease. She presents with complaints of chest pain/pressure. EKG does not have any evidence of acute cardiac ischemia and troponin is within normal limits. Admit patient for further evaluation of chest pain. Plan:     1. Admit to telemetry bed under observation status. 2. Order 243 mg aspirin PO once for acute chest pain. Trend troponin levels Q6H.   3. Cardiology consult. Keep NPO until evaluated by cardiology. Order sublingual nitroglycerin and IV morphine PRN chest pain. Order oxygen supplementation PRN recurrent chest pain. 4. Check lipid panel. 5. Patient presented with hypokalemia. Potassium supplement has been ordered in the emergency room. Check magnesium level. Continue to monitor electrolytes during hospitalization and replenish as needed. 6. History of coronary artery disease, continue home dose of Coreg (holding parameters), Plavix, Lovenox, Zetia and Lipitor. 7. History of COPD, continue home dose of Spiriva. Order albuterol as needed for shortness of breath or wheezing. 8. History of hypertension, continue home dose of Benicar (with holding parameters). 9. Order nicotine patch for nicotine dependence. Order smoking cessation counseling per hospital protocol to be administered by nursing staff. GI PPX: IV Famotidine while NPO. DVT PPX: Lovenox SQ.      Signed By: Yelitza Villa MD     November 18, 2020

## 2020-11-18 NOTE — PROGRESS NOTES
*ATTENTION:  This note has been created by a medical student for educational purposes only. Please do not refer to the content of this note for clinical decision-making, billing, or other purposes. Please see attending physicians note to obtain clinical information on this patient. *              Consult    Patient: Jose Hernandez MRN: [id number]  SSN: [id number]    YOB: 1950  Age: 81 y.o. Sex: female      Subjective:      81 y.o. female with history of cardiac stent presents to the ED with cc of left sided chest pain that started 5 PM last night. The pain is described as a pressure sensation to radiate the left arm. The patient states that she took an extra dose of Plavix and aspirin before she came to the emergency room. Took sublingual nitroglycerin and found no relief and noted the pain to be 8/10. Today she notes that her chest pain has improved and is 5/10. Substernal chest pain radiates [facility] down the left arm. Denies any fever, chills, nausea, shortness of breath.      Past Medical History:   Diagnosis Date    Chronic anemia     Chronic obstructive pulmonary disease (HCC)     GERD (gastroesophageal reflux disease)     High cholesterol     Hypertension     Hypertension     Obesity     CURT (obstructive sleep apnea)     Sick sinus syndrome (HCC)      Past Surgical History:   Procedure Laterality Date    HX AAA REPAIR      HX CHOLECYSTECTOMY      HX PACEMAKER      HX TUMOR REMOVAL      Right neck, benign results      Family History   Problem Relation Age of Onset    Heart Failure Father     Coronary Artery Disease Brother      Social History     Tobacco Use    Smoking status: Current Every Day Smoker     Packs/day: 1.00    Smokeless tobacco: Never Used   Substance Use Topics    Alcohol use: Not Currently     Comment: no      Current Facility-Administered Medications   Medication Dose Route Frequency Provider Last Rate Last Dose    nitroglycerin (NITROSTAT) tablet 0.4 mg 0.4 mg SubLINGual PRN Ethel Aparicio MD        acetaminophen (TYLENOL) tablet 650 mg  650 mg Oral Q6H PRN Ethel Aparicio MD        Or    acetaminophen (TYLENOL) suppository 650 mg  650 mg Rectal Q6H PRN Ethel Aparicio MD        polyethylene glycol (MIRALAX) packet 17 g  17 g Oral DAILY PRN Ethel Aparicio MD        promethazine (PHENERGAN) tablet 12.5 mg  12.5 mg Oral Q6H PRN Ethel Aparicio MD        Or    ondansetron [facility]) injection 4 mg  4 mg IntraVENous Q6H PRN Ethel Aparicio MD        enoxaparin (LOVENOX) injection 40 mg  40 mg SubCUTAneous DAILY Ethel Aparicio MD   40 mg at 11/18/20 0935    0.9% sodium chloride infusion  50 mL/hr IntraVENous CONTINUOUS Ethel Aparicio MD        famotidine (PF) (PEPCID) 20 mg in 0.9% sodium chloride 10 mL injection  20 mg IntraVENous Q12H Ethel Aparicio MD   20 mg at 11/18/20 1984    aspirin chewable tablet 243 mg  243 mg Oral ONCE Ethel Aparicio MD        morphine injection 4 mg  4 mg IntraVENous Q4H PRN Ethel Aparicio MD        nicotine (NICODERM CQ) 21 mg/24 hr patch 1 Patch  1 Patch TransDERmal DAILY PRN Ethel Aparicio MD        albuterol (PROVENTIL HFA, VENTOLIN HFA, PROAIR HFA) inhaler 2 Puff  2 Puff Inhalation Q6H PRN Ethel Aparicio MD        atorvastatin (LIPITOR) tablet 10 mg  10 mg Oral QHS Ethel Aparicio MD        calcium-vitamin D 600 mg(1,500mg) -200 unit per tablet 1 Tab  1 Tab Oral DAILY Ethel Aparicio MD   1 Tab at 11/18/20 0935    carvediloL (COREG) tablet 3.125 mg  3.125 mg Oral DAILY Ethel Aparicio MD   Stopped at 11/18/20 0900    clopidogreL (PLAVIX) tablet 75 mg  75 mg Oral DAILY Ethel Aparicio MD   75 mg at 11/18/20 3750    ezetimibe (ZETIA) tablet 10 mg  10 mg Oral DAILY Ethel Aparicio MD   10 mg at 11/18/20 9402    losartan (COZAAR) tablet 50 mg  50 mg Oral DAILY Ethel Aparicio MD   Stopped at 11/18/20 0900    ipratropium (ATROVENT) 0.02 % nebulizer solution 0.5 mg  500 mcg Nebulization Q8H RT Alejandra Guerra MD        potassium chloride (K-DUR, KLOR-CON) SR tablet 40 mEq  40 mEq Oral NOW Jonas Wilson MD         Current Outpatient Medications   Medication Sig Dispense Refill    ezetimibe (ZETIA) 10 mg tablet Take 10 mg by mouth daily.  atorvastatin (Lipitor) 10 mg tablet Take 10 mg by mouth nightly.  clopidogreL (PLAVIX) 75 mg tab Take 75 mg by mouth daily.  carvediloL (COREG) 3.125 mg tablet Take 3.125 mg by mouth daily.  nitroglycerin (NITROSTAT) 0.4 mg SL tablet 0.4 mg by SubLINGual route as needed for Chest Pain.  olmesartan (BENICAR) 20 mg tablet Take 20 mg by mouth daily.  albuterol (ProAir HFA) 90 mcg/actuation inhaler Take 2 Puffs by inhalation every six (6) hours as needed for Shortness of Breath.  albuterol (PROVENTIL VENTOLIN) 2.5 mg /3 mL (0.083 %) nebu Take 3 mL by inhalation every six (6) hours as needed for Shortness of Breath.  calcium-vitamin D (Os-Carlos 500+D) 500 mg-200 unit per tablet Take 1 Tab by mouth daily.  omega-3 acid ethyl esters (LOVAZA) 1 gram capsule Take 1 Cap by mouth two (2) times a day.  tiotropium (Spiriva with HandiHaler) 18 mcg inhalation capsule Take 1 Inhalation by mouth daily.  pantoprazole (PROTONIX) 40 mg tablet Take 1 Tab by mouth daily. Allergies   Allergen Reactions    Lipitor [Atorvastatin] Unknown (comments)     Pt can take Crestor    Niacin Unknown (comments)    Statins-Hmg-Coa Reductase Inhibitors Unknown (comments)    Tape [Adhesive] Unknown (comments)    Tricor [Fenofibrate Micronized] Unknown (comments)    Varenicline Unknown (comments)     Other reaction(s): Chest Pain       Review of Systems:  A comprehensive review of systems was negative except for that written in the History of Present Illness.     Objective:     Vitals:    11/18/20 0249 11/18/20 0427 11/18/20 0627 11/18/20 0900   BP: 101/67 121/62 (!) 94/50 112/72   Pulse: 84 81 76 77   Resp: 14 14 16 18 Temp: 97.9 °F (36.6 °C)   98.2 °F (36.8 °C)   SpO2: 98% 99% 98% 96%   Weight: 67.1 kg (148 lb)      Height: 5' 7\" (1.702 m)           Physical Exam:  General: Alert and Oriented x 3. Currently denies chest pain. Cooperative and friendly. No acute distress. Obese and well developed. HEAD, EYES: Normocephalic, atraumatic, EOMI, PERRLA. Nose: Turbinates within normal limits, No drainage. Throat and Neck: Posterior pharynx without erythema or exudate. No masses, JVD, thyromegaly or lymphadenopathy appreciated. Cervical spine has good range of motion without pain. Lungs: Clear to auscultation bilaterally without wheezes, rhonchi or crackles. Good air movement bilaterally. Symmetric chest rise with respirations. Heart/Chest: Regular rate and rhythm. Normal S1/S2. No appreciated murmurs, rubs or gallops. Chest pain is not reproducible with palpation over anterior chest wall. No lower extremity edema. Pacemaker site is clean, dry and intact. Abdomen: Soft, non-tender, non-distended. Bowel sounds present in all four quadrants. No masses appreciated. Extremities:  Atraumatic. Able to move all extremities symmetrically. No abnormal bony protuberances appreciated. Back: No pain with palpation over spinous processes or paraspinal musculature. No CVA tenderness. Skin: Clean, dry and intact without appreciated lesions. Neurologic: A&Ox3. Cranial nerves 2-12 are grossly intact. Intact sensation. 5/5 motor strength in all 4 extremities. No focal deficits. Psychiatric: Normal affect, normal thought process, good eye contact. Assessment:     Hospital Problems  Never Reviewed          Codes Class Noted POA    Chest pain ICD-10-CM: R07.9  ICD-9-CM: 786.50  11/18/2020 Unknown              Plan:     1. Admit to telemetry bed under observation status. 2. Order 243 mg aspirin PO once for acute chest pain. Trend troponin levels Q6H.   3. Cardiology consult. Keep NPO until evaluated by cardiology.  Order sublingual nitroglycerin and IV morphine PRN chest pain. Order oxygen supplementation PRN recurrent chest pain. 4. Check lipid panel. 5. Patient presented with hypokalemia. Potassium supplement has been ordered in the emergency room. Check magnesium level. Continue to monitor electrolytes during hospitalization and replenish as needed. 6. History of coronary artery disease, continue home dose of Coreg (holding parameters), Plavix, Lovenox, Zetia and Lipitor. 7. History of COPD, continue home dose of Spiriva. Order albuterol as needed for shortness of breath or wheezing. 8. History of hypertension, continue home dose of Benicar (with holding parameters). 9. Order nicotine patch for nicotine dependence. Order smoking cessation counseling per hospital protocol to be administered by nursing staff.     GI PPX: IV Famotidine while NPO. DVT PPX: Lovenox SQ.      Signed By: Lashanda Grijalva     November 18, 2020

## 2020-11-18 NOTE — ED PROVIDER NOTES
EMERGENCY DEPARTMENT HISTORY AND PHYSICAL EXAM      Date: 11/18/2020  Patient Name: Zachariah Heath    History of Presenting Illness     Chief Complaint   Patient presents with    Chest Pain       History Provided By: Patient    HPI: Zachariah Heath, 81 y.o. female   presents to the ED with cc of pain. Patient with history of cardiac stent complains of constant left sided chest pain that started 5 PM last night. The pain is described as a pressure sensation to radiate the left arm. Patient complains of mild shortness of breath without diaphoresis palpitation or syncope. The pain was alleviated with use of nitroglycerin. The patient states that she took an extra dose of Plavix and aspirin before she came to the emergency room. PCP: Unknown, Provider    No current facility-administered medications on file prior to encounter. No current outpatient medications on file prior to encounter. Past History     Past Medical History:  Past Medical History:   Diagnosis Date    Chronic obstructive pulmonary disease ([facility] [address]     Hypertension        Past Surgical History:  Past Surgical History:   Procedure Laterality Date    BREAST SURGERY PROCEDURE UNLISTED      CARDIAC SURG PROCEDURE UNLIST      HX CHOLECYSTECTOMY      HX PACEMAKER         Family History:  History reviewed. No pertinent family history. Social History:  Social History     Tobacco Use    Smoking status: Current Every Day Smoker     Packs/day: 1.00   Substance Use Topics    Alcohol use: Not Currently     Comment: no    Drug use: Never       Allergies:   Allergies   Allergen Reactions    Chantix [Varenicline] Unknown (comments)    Lipitor [Atorvastatin] Unknown (comments)     Pt can take Crestor    Niacin Unknown (comments)    Tape [Adhesive] Unknown (comments)    Tricor [Fenofibrate Micronized] Unknown (comments)    Zocor [Simvastatin] Unknown (comments)     Pt can take Crestor         Review of Systems   Review of Systems Constitutional: Negative for activity change, appetite change, chills and fever. HENT: Negative for sore throat. Eyes: Negative for discharge. Respiratory: Negative for shortness of breath. Cardiovascular: Positive for chest pain. Gastrointestinal: Negative for nausea. Endocrine: Negative for polyuria. Genitourinary: Negative for difficulty urinating. Musculoskeletal: Negative for arthralgias. Skin: Negative for rash. Neurological: Negative for headaches. Hematological: Negative for adenopathy. Psychiatric/Behavioral: Negative for dysphoric mood. All other systems reviewed and are negative. Physical Exam   Physical Exam  Vitals signs and nursing note reviewed. Constitutional:       Appearance: Normal appearance. HENT:      Head: Normocephalic and atraumatic. Nose: Nose normal.      Mouth/Throat:      Mouth: Mucous membranes are moist.      Pharynx: Oropharynx is clear. Eyes:      Conjunctiva/sclera: Conjunctivae normal.   Neck:      Musculoskeletal: Neck supple. Cardiovascular:      Rate and Rhythm: Normal rate and regular rhythm. Heart sounds: Normal heart sounds. Pulmonary:      Effort: Pulmonary effort is normal.      Breath sounds: Normal breath sounds. Abdominal:      General: Abdomen is flat. Bowel sounds are normal.      Palpations: Abdomen is soft. Musculoskeletal:      Right lower leg: No edema. Left lower leg: No edema. Skin:     General: Skin is warm and dry. Neurological:      General: No focal deficit present. Mental Status: She is alert and oriented to person, place, and time.    Psychiatric:         Mood and Affect: Mood normal.         Diagnostic Study Results     Labs -     Recent Results (from the past 12 hour(s))   CBC WITH AUTOMATED DIFF    Collection Time: 11/18/20  3:10 AM   Result Value Ref Range    WBC 9.4 3.6 - 11.0 K/uL    RBC 2.91 (L) 3.80 - 5.20 M/uL    HGB 9.6 (L) 11.5 - 16.0 g/dL    HCT 29.9 (L) 35.0 - 47.0 %    MCV 102.7 (H) 80.0 - 99.0 FL    MCH 33.0 26.0 - 34.0 PG    MCHC 32.1 30.0 - 36.5 g/dL    RDW 14.3 11.5 - 14.5 %    PLATELET 142 821 - 272 K/uL    MPV 9.4 8.9 - 12.9 FL    NEUTROPHILS 68 32 - 75 %    LYMPHOCYTES 24 12 - 49 %    MONOCYTES 6 5 - 13 %    EOSINOPHILS 2 0 - 7 %    BASOPHILS 0 0 - 1 %    IMMATURE GRANULOCYTES 0 0.0 - 0.5 %    ABS. NEUTROPHILS 6.3 1.8 - 8.0 K/UL    ABS. LYMPHOCYTES 2.3 0.8 - 3.5 K/UL    ABS. MONOCYTES 0.6 0.0 - 1.0 K/UL    ABS. EOSINOPHILS 0.2 0.0 - 0.4 K/UL    ABS. BASOPHILS 0.0 0.0 - 0.1 K/UL    ABS. IMM. GRANS. 0.0 0.00 - 0.04 K/UL    DF AUTOMATED     METABOLIC PANEL, BASIC    Collection Time: 11/18/20  3:10 AM   Result Value Ref Range    Sodium 147 (H) 136 - 145 mmol/L    Potassium 3.2 (L) 3.5 - 5.1 mmol/L    Chloride 117 (H) 97 - 108 mmol/L    CO2 24 21 - 32 mmol/L    Anion gap 6 5 - 15 mmol/L    Glucose 99 65 - 100 mg/dL    BUN 12 6 - 20 mg/dL    Creatinine 0.54 (L) 0.55 - 1.02 mg/dL    BUN/Creatinine ratio 22 (H) 12 - 20      GFR est AA >60 >60 ml/min/1.73m2    GFR est non-AA >60 >60 ml/min/1.73m2    Calcium PENDING mg/dL   TROPONIN I    Collection Time: 11/18/20  3:10 AM   Result Value Ref Range    Troponin-I, Qt. <0.05 <0.05 ng/mL       Radiologic Studies -   XR CHEST SNGL V   Final Result   IMPRESSION: No acute cardiopulmonary abnormality. .         CT Results  (Last 48 hours)    None        CXR Results  (Last 48 hours)               11/18/20 0330  XR CHEST SNGL V Final result    Impression:  IMPRESSION: No acute cardiopulmonary abnormality. .        Narrative:  HISTORY:  CP       TECHNIQUE:  AP chest radiograph       COMPARISON: 2/20/2013   LIMITATIONS: None       TUBES/LINES: Dual-lead left chest wall AICD appears in place       LUNG PARENCHYMA: Normal   TRACHEA/BRONCHI: Normal   PULMONARY VESSELS: Normal   PLEURA: Normal   HEART: Normal   AORTIC SHADOW: Calcified aortic knob noted otherwise unremarkable. MEDIASTINUM: Normal   BONE/SOFT TISSUES: No acute abnormality. OTHER: None               AG normal sinus rhythm at 88. QT of 342 normal axis. Q waves in anterior leads. No ST elevation. Medical Decision Making   I am the first provider for this patient. I reviewed the vital signs, available nursing notes, past medical history, past surgical history, family history and social history. Vital Signs-Reviewed the patient's vital signs. Patient Vitals for the past 12 hrs:   Temp Pulse Resp BP SpO2   11/18/20 0249 97.9 °F (36.6 °C) 84 14 101/67 98 %       Records Reviewed:     Provider Notes (Medical Decision Making):       ED Course:   Initial assessment performed. The patients presenting problems have been discussed, and they are in agreement with the care plan formulated and outlined with them. I have encouraged them to ask questions as they arise throughout their visit. PROCEDURES        1. There are no discharge medications for this patient. 2.   Follow-up Information    None       Return to ED if worse     Diagnosis     Clinical Impression:   1. Acute chest pain        Please note that this dictation was completed with [employer], the computer voice recognition software. Quite often unanticipated grammatical, syntax, homophones, and other interpretive errors are inadvertently transcribed by the computer software. Please disregard these errors. Please excuse any errors that have escaped final proofreading. Thank you.

## 2020-11-18 NOTE — PROGRESS NOTES
Coronary arteriogram was performed through the left radial.  Proximal left subclavian has about a 60% stenosis with ectasia. May need further evaluation. Right coronary stent is patent. Left anterior descending artery has 70% stenosis in the terminal region and medical management is planned. Patient is advised strongly to stop smoking and she agrees. She may need a higher dose of statin. She had severe myalgias so a low-dose statin was used. I will first change atorvastatin to rosuvastatin and then adjust as outpatient. Patient may be discharged in a.m. and follow-up with Dr. Antionette Haskins in a week.

## 2020-11-19 VITALS
HEIGHT: 67 IN | TEMPERATURE: 97.9 F | DIASTOLIC BLOOD PRESSURE: 65 MMHG | RESPIRATION RATE: 20 BRPM | SYSTOLIC BLOOD PRESSURE: 108 MMHG | OXYGEN SATURATION: 96 % | BODY MASS INDEX: 23.23 KG/M2 | WEIGHT: 148 LBS | HEART RATE: 80 BPM

## 2020-11-19 PROBLEM — I24.9 ACS (ACUTE CORONARY SYNDROME) (HCC): Status: ACTIVE | Noted: 2020-11-19

## 2020-11-19 PROCEDURE — 74011250637 HC RX REV CODE- 250/637: Performed by: FAMILY MEDICINE

## 2020-11-19 PROCEDURE — 99218 HC RM OBSERVATION: CPT

## 2020-11-19 PROCEDURE — 74011000250 HC RX REV CODE- 250: Performed by: FAMILY MEDICINE

## 2020-11-19 PROCEDURE — 94640 AIRWAY INHALATION TREATMENT: CPT

## 2020-11-19 PROCEDURE — 94760 N-INVAS EAR/PLS OXIMETRY 1: CPT

## 2020-11-19 PROCEDURE — 74011250636 HC RX REV CODE- 250/636: Performed by: FAMILY MEDICINE

## 2020-11-19 PROCEDURE — 74011250637 HC RX REV CODE- 250/637: Performed by: INTERNAL MEDICINE

## 2020-11-19 RX ORDER — IBUPROFEN 200 MG
1 TABLET ORAL EVERY 24 HOURS
Qty: 30 PATCH | Refills: 0 | Status: SHIPPED | OUTPATIENT
Start: 2020-11-19 | End: 2020-12-19

## 2020-11-19 RX ORDER — GUAIFENESIN 100 MG/5ML
81 LIQUID (ML) ORAL DAILY
Qty: 30 TAB | Refills: 0 | Status: SHIPPED | OUTPATIENT
Start: 2020-11-20 | End: 2020-12-20

## 2020-11-19 RX ORDER — CLOPIDOGREL BISULFATE 75 MG/1
75 TABLET ORAL DAILY
COMMUNITY

## 2020-11-19 RX ADMIN — Medication 10 ML: at 06:15

## 2020-11-19 RX ADMIN — IPRATROPIUM BROMIDE 0.5 MG: 0.5 SOLUTION RESPIRATORY (INHALATION) at 08:37

## 2020-11-19 RX ADMIN — ENOXAPARIN SODIUM 40 MG: 40 INJECTION SUBCUTANEOUS at 09:29

## 2020-11-19 RX ADMIN — ASPIRIN 81 MG CHEWABLE TABLET 81 MG: 81 TABLET CHEWABLE at 09:29

## 2020-11-19 RX ADMIN — IPRATROPIUM BROMIDE 0.5 MG: 0.5 SOLUTION RESPIRATORY (INHALATION) at 00:27

## 2020-11-19 RX ADMIN — CLOPIDOGREL BISULFATE 75 MG: 75 TABLET ORAL at 09:29

## 2020-11-19 RX ADMIN — FAMOTIDINE 20 MG: 10 INJECTION INTRAVENOUS at 09:29

## 2020-11-19 RX ADMIN — EZETIMIBE 10 MG: 10 TABLET ORAL at 09:29

## 2020-11-19 NOTE — PROGRESS NOTES
Primary Nurse Yared Fontanez and Seng Carter, RN performed a dual skin assessment on this patient No impairment noted  Aramis score is 22      Problem: Unstable angina/NSTEMI: Day of Admission/Day 1  Goal: Activity/Safety  Outcome: Progressing Towards Goal  Note: Patient is has steady gait and Bed is in the lowest position and wheels are locked, call bell is within reach, bathroom light is on during evening hours, gripper socks are on and patient has been instructed to call out for assistance if needed. As of now, patient is free from falls and will continue to be monitored.      Goal: Diagnostic Test/Procedures  Outcome: Progressing Towards Goal  Note: Patient had a heart cath today with no interventions  Goal: Nutrition/Diet  Outcome: Progressing Towards Goal  Note: Pt is tolerating her diet with no nausea

## 2020-11-19 NOTE — PROGRESS NOTES
*ATTENTION:  This note has been created by a medical student for educational purposes only. Please do not refer to the content of this note for clinical decision-making, billing, or other purposes. Please see attending physicians note to obtain clinical information on this patient. * Consult Patient: Alfredito Galindo MRN: [id number]  SSN: [id number] YOB: 1950  Age: 81 y.o. Sex: female Subjective:  
  
81 y.o. female with history of cardiac stent presents to the ED with cc of left sided chest pain that started 5 PM last night. The pain is described as a pressure sensation to radiate the left arm. The patient states that she took an extra dose of Plavix and aspirin before she came to the emergency room. Took sublingual nitroglycerin and found no relief and noted the pain to be 8/10. Today she notes that her chest pain has improved and is 5/10. Substernal chest pain radiates FPC down the left arm. Denies any fever, chills, nausea, shortness of breath. Past Medical History:  
Diagnosis Date  Chronic anemia  Chronic obstructive pulmonary disease ([address]  GERD (gastroesophageal reflux disease)  High cholesterol  Hypertension  Hypertension  Obesity  CURT (obstructive sleep apnea)  Sick sinus syndrome ([address] Past Surgical History:  
Procedure Laterality Date  HX AAA REPAIR    
 HX CHOLECYSTECTOMY  HX PACEMAKER    
 HX TUMOR REMOVAL Right neck, benign results Family History Problem Relation Age of Onset  Heart Failure Father  Coronary Artery Disease Brother Social History Tobacco Use  Smoking status: Current Every Day Smoker Packs/day: 1.00  Smokeless tobacco: Never Used Substance Use Topics  Alcohol use: Not Currently Comment: no  
  
Current Facility-Administered Medications Medication Dose Route Frequency Provider Last Rate Last Dose  nitroglycerin (NITROSTAT) tablet 0.4 mg  0.4 mg SubLINGual PRN Charles Fierro MD      
 polyethylene glycol (MIRALAX) packet 17 g  17 g Oral DAILY PRN Charles Fierro MD      
 promethazine (PHENERGAN) tablet 12.5 mg  12.5 mg Oral Q6H PRN Charles Fierro MD      
 Or  
 ondansetron [facility]) injection 4 mg  4 mg IntraVENous Q6H PRN Charles Fierro MD      
 enoxaparin (LOVENOX) injection 40 mg  40 mg SubCUTAneous DAILY Charles Fierro MD   40 mg at 11/18/20 9941  famotidine (PF) (PEPCID) 20 mg in 0.9% sodium chloride 10 mL injection  20 mg IntraVENous Q12H Charles Fierro MD   20 mg at 11/18/20 2146  morphine injection 4 mg  4 mg IntraVENous Q4H PRN Charles Fierro MD      
 nicotine (NICODERM CQ) 21 mg/24 hr patch 1 Patch  1 Patch TransDERmal DAILY PRN Charles Fierro MD      
 albuterol (PROVENTIL HFA, VENTOLIN HFA, PROAIR HFA) inhaler 2 Puff  2 Puff Inhalation Q6H PRN Charles Fierro MD      
 atorvastatin (LIPITOR) tablet 10 mg  10 mg Oral QHS Charles Fierro MD   10 mg at 11/18/20 2146  calcium-vitamin D 600 mg(1,500mg) -200 unit per tablet 1 Tab  1 Tab Oral DAILY Charles Fierro MD   1 Tab at 11/18/20 7011  carvediloL (COREG) tablet 3.125 mg  3.125 mg Oral DAILY Charles Fierro MD   Stopped at 11/18/20 0900  
 ezetimibe (ZETIA) tablet 10 mg  10 mg Oral DAILY Charles Fierro MD   10 mg at 11/18/20 9859  losartan (COZAAR) tablet 50 mg  50 mg Oral DAILY Charles Fierro MD   Stopped at 11/18/20 0900  ipratropium (ATROVENT) 0.02 % nebulizer solution 0.5 mg  500 mcg Nebulization Q8H RT Nirav MEDINA MD   0.5 mg at 11/19/20 0837  
 sodium chloride (NS) flush 5-40 mL  5-40 mL IntraVENous Q8H Liyah Gaspar MD   10 mL at 11/19/20 8091  sodium chloride (NS) flush 5-40 mL  5-40 mL IntraVENous PRN Liyah Gaspar MD      
 diphenhydrAMINE (BENADRYL) injection 50 mg  50 mg IntraVENous Q4H PRN Liyah Gaspar MD      
  aspirin chewable tablet 81 mg  81 mg Oral DAILY Martha Arriaga MD      
 clopidogreL (PLAVIX) tablet 75 mg  75 mg Oral DAILY Martha Arriaga MD      
  
 
Allergies Allergen Reactions  Lipitor [Atorvastatin] Unknown (comments) Pt can take Crestor  Niacin Unknown (comments)  Statins-Hmg-Coa Reductase Inhibitors Unknown (comments)  Tape [Adhesive] Unknown (comments)  Tricor [Fenofibrate Micronized] Unknown (comments)  Varenicline Unknown (comments) Other reaction(s): Chest Pain Review of Systems: A comprehensive review of systems was negative except for that written in the History of Present Illness. Objective:  
 
Vitals:  
 11/19/20 0407 11/19/20 0745 11/19/20 8415 11/19/20 9850 BP: (!) 93/54   107/64 Pulse: 63 76  81 Resp: 18   18 Temp: 98 °F (36.7 °C)   98 °F (36.7 °C) SpO2: 95%  95% 94% Weight:      
Height:      
  
 
Physical Exam: 
General: Alert and Oriented x 3. Currently denies chest pain. Cooperative and friendly. No acute distress. Obese and well developed. HEAD, EYES: Normocephalic, atraumatic, EOMI, PERRLA. Nose: Turbinates within normal limits, No drainage. Throat and Neck: Posterior pharynx without erythema or exudate. No masses, JVD, thyromegaly or lymphadenopathy appreciated. Cervical spine has good range of motion without pain. Lungs: Clear to auscultation bilaterally without wheezes, rhonchi or crackles. Good air movement bilaterally. Symmetric chest rise with respirations. Heart/Chest: Regular rate and rhythm. Normal S1/S2. No appreciated murmurs, rubs or gallops. Chest pain is not reproducible with palpation over anterior chest wall. No lower extremity edema. Pacemaker site is clean, dry and intact. Abdomen: Soft, non-tender, non-distended. Bowel sounds present in all four quadrants. No masses appreciated. Extremities:  Atraumatic. Able to move all extremities symmetrically. No abnormal bony protuberances appreciated. Back: No pain with palpation over spinous processes or paraspinal musculature. No CVA tenderness. Skin: Clean, dry and intact without appreciated lesions. Neurologic: A&Ox3. Cranial nerves 2-12 are grossly intact. Intact sensation. 5/5 motor strength in all 4 extremities. No focal deficits. Psychiatric: Normal affect, normal thought process, good eye contact. Assessment:  
 
Hospital Problems  Never Reviewed Codes Class Noted POA Chest pain ICD-10-CM: R07.9 ICD-9-CM: 786.50  11/18/2020 Unknown Plan: 1. Admit to telemetry bed under observation status. 2. Order 243 mg aspirin PO once for acute chest pain. Trend troponin levels Q6H.  
3. Cardiology consult. Keep NPO until evaluated by cardiology. Order sublingual nitroglycerin and IV morphine PRN chest pain. Order oxygen supplementation PRN recurrent chest pain. 4. Check lipid panel. 5. Patient presented with hypokalemia. Potassium supplement has been ordered in the emergency room. Check magnesium level. Continue to monitor electrolytes during hospitalization and replenish as needed. 6. History of coronary artery disease, continue home dose of Coreg (holding parameters), Plavix, Lovenox, Zetia and Lipitor. 7. History of COPD, continue home dose of Spiriva. Order albuterol as needed for shortness of breath or wheezing. 8. History of hypertension, continue home dose of Benicar (with holding parameters). 9. Order nicotine patch for nicotine dependence. Order smoking cessation counseling per hospital protocol to be administered by nursing staff. 
  
GI PPX: IV Famotidine while NPO. DVT PPX: Lovenox SQ. Signed By: Patsy Benson November 19, 2020

## 2020-11-19 NOTE — PROGRESS NOTES
IV removed, catheter tip intact. Telemetry box removed and returned. Discussed discharge diagnosis, when to take next dose of each medication, sie effects of medications, follow-up apts, post cardiac cath care of left wrist, when to call the HCP, and when to dial 9-1-1. Patient verbalized understanding. No further questions or concerns at this time. Per patient, she has aspirin 81 mg and plavix 75 mg at home. Patient left the floor via wheelchair at 1227.

## 2020-11-19 NOTE — DISCHARGE SUMMARY
Physician Discharge Summary     Patient ID:    Cynthia Hayden  [id number]  21 y.o.  1950    Admit date: 11/18/2020    Discharge date : 11/19/2020    Chronic Diagnoses:    Problem List as of 11/19/2020 Never Reviewed          Codes Class Noted - Resolved    ACS (acute coronary syndrome) ([facility] ICD-10-CM: I24.9  ICD-9-CM: 411.1  11/19/2020 - Present        Chest pain ICD-10-CM: R07.9  ICD-9-CM: 786.50  11/18/2020 - Present        Allergic rhinitis ICD-10-CM: J30.9  ICD-9-CM: 477.9  10/8/2019 - Present        Chronic obstructive lung disease ([facility] ICD-10-CM: J44.9  ICD-9-CM: 230  10/8/2019 - Present        Dyslipidemia ICD-10-CM: E78.5  ICD-9-CM: 272.4  10/8/2019 - Present        Obesity ICD-10-CM: E66.9  ICD-9-CM: 278.00  10/8/2019 - Present        Obstructive sleep apnea of adult ICD-10-CM: G47.33  ICD-9-CM: 327.23  10/8/2019 - Present        Sick sinus syndrome ([facility] ICD-10-CM: I49.5  ICD-9-CM: 427.81  10/8/2019 - Present        Gastroesophageal reflux disease without esophagitis ICD-10-CM: K21.9  ICD-9-CM: 530.81  10/8/2019 - Present        Essential hypertension ICD-10-CM: I10  ICD-9-CM: 401.9  10/8/2019 - Present        Tobacco dependence syndrome ICD-10-CM: F17.200  ICD-9-CM: 305.1  1/24/2012 - Present        Coronary arteriosclerosis ICD-10-CM: I25.10  ICD-9-CM: 414.00  1/24/2012 - Present          22    Final Diagnoses:   Chest pain [R07.9]  Chest pain [R07.9]  Chest pain [R07.9]  Chest pain [R07.9]  ACS (acute coronary syndrome) ([facility] [I24.9]    Reason for Hospitalization:  Chest pain      Hospital Course: 61-year-old pleasant lady admitted for substernal chest pain. Due to concerns of acute coronary syndrome, she was taken to cardiac catheterization lab. No interventions.   Advised smoking cessation and follow-up with Dr. Rupinder Glynn outpatient                Discharge Medications:   Current Discharge Medication List      START taking these medications    Details   aspirin 81 mg chewable tablet Take 1 Tab by mouth daily for 30 days. Qty: 30 Tab, Refills: 0      nicotine (NICODERM CQ) 21 mg/24 hr 1 Patch by TransDERmal route every twenty-four (24) hours for 30 days. Qty: 30 Patch, Refills: 0         CONTINUE these medications which have NOT CHANGED    Details   fluticasone/vilanterol (BREO ELLIPTA IN) Take  by inhalation daily. ezetimibe (ZETIA) 10 mg tablet Take 10 mg by mouth daily. atorvastatin (Lipitor) 10 mg tablet Take 10 mg by mouth nightly. carvediloL (COREG) 3.125 mg tablet Take 3.125 mg by mouth daily. nitroglycerin (NITROSTAT) 0.4 mg SL tablet 0.4 mg by SubLINGual route as needed for Chest Pain. olmesartan (BENICAR) 20 mg tablet Take 10 mg by mouth daily. albuterol (ProAir HFA) 90 mcg/actuation inhaler Take 2 Puffs by inhalation every six (6) hours as needed for Shortness of Breath. albuterol (PROVENTIL VENTOLIN) 2.5 mg /3 mL (0.083 %) nebu Take 3 mL by inhalation every six (6) hours as needed for Shortness of Breath. calcium-vitamin D (Os-Carlos 500+D) 500 mg-200 unit per tablet Take 1 Tab by mouth daily. omega-3 acid ethyl esters (LOVAZA) 1 gram capsule Take 1 Cap by mouth two (2) times a day. tiotropium (Spiriva with HandiHaler) 18 mcg inhalation capsule Take 1 Inhalation by mouth daily. pantoprazole (PROTONIX) 40 mg tablet Take 1 Tab by mouth daily. STOP taking these medications       clopidogreL (PLAVIX) 75 mg tab Comments:   Reason for Stopping: Follow up Care:    1. Unknown, Provider in 1-2 weeks. Please call to set up an appointment shortly after discharge. Diet:  Cardiac Diet    Disposition:  Home.     Advanced Directive:   FULL    DNR      Discharge Exam:  Visit Vitals  /64   Pulse 81   Temp 98 °F (36.7 °C)   Resp 18   Ht 5' 7\" (1.702 m)   Wt 67.1 kg (148 lb)   SpO2 94%   BMI 23.18 kg/m²      O2 Device: Room air    Temp (24hrs), Av.8 °F (36.6 °C), Min:97.3 °F (36.3 °C), Max:98.1 °F (36.7 °C)    No intake/output data recorded. No intake/output data recorded. General:  Alert, cooperative, no distress, appears stated age. Lungs:   Clear to auscultation bilaterally. Chest wall:  No tenderness or deformity. Heart:  Regular rate and rhythm, S1, S2 normal, no murmur, click, rub or gallop. Abdomen:   Soft, non-tender. Bowel sounds normal. No masses,  No organomegaly. Extremities: Extremities normal, atraumatic, no cyanosis or edema. Pulses: 2+ and symmetric all extremities. Skin: Skin color, texture, turgor normal. No rashes or lesions   Neurologic: CNII-XII intact. No gross sensory or motor deficits         CONSULTATIONS: Cardiology    Significant Diagnostic Studies:   11/18/2020: BUN 12 mg/dL (Ref range: 6 - 20 mg/dL); BUN 9 mg/dL (Ref range: 6 - 20 mg/dL); Calcium 8.2 mg/dL* (Ref range: 8.5 - 10.1 mg/dL); CO2 24 mmol/L (Ref range: 21 - 32 mmol/L); CO2 28 mmol/L (Ref range: 21 - 32 mmol/L); Creatinine 0.54 mg/dL* (Ref range: 0.55 - 1.02 mg/dL); Creatinine 0.57 mg/dL (Ref range: 0.55 - 1.02 mg/dL); Glucose 99 mg/dL (Ref range: 65 - 100 mg/dL); Glucose 90 mg/dL (Ref range: 65 - 100 mg/dL); HCT 29.9 %* (Ref range: 35.0 - 47.0 %); HCT 31.9 %* (Ref range: 35.0 - 47.0 %); HGB 9.6 g/dL* (Ref range: 11.5 - 16.0 g/dL); HGB 10.0 g/dL* (Ref range: 11.5 - 16.0 g/dL); Potassium 3.2 mmol/L* (Ref range: 3.5 - 5.1 mmol/L); Potassium 4.0 mmol/L (Ref range: 3.5 - 5.1 mmol/L); Sodium 147 mmol/L* (Ref range: 136 - 145 mmol/L);  Sodium 141 mmol/L (Ref range: 136 - 145 mmol/L)  Recent Labs     11/18/20 1835 11/18/20  0310   WBC 9.9 9.4   HGB 10.0* 9.6*   HCT 31.9* 29.9*    292     Recent Labs     11/18/20 1835 11/18/20  0500 11/18/20  0310     --  147*   K 4.0  --  3.2*   *  --  117*   CO2 28  --  24   BUN 9  --  12   CREA 0.57  --  0.54*   GLU 90  --  99   CA 8.2*  --   --    MG  --  2.2  --      No results for input(s): ALT, AP, TBIL, TBILI, TP, ALB, GLOB, GGT, AML, LPSE in the last 72 hours. No lab exists for component: SGOT, GPT, AMYP, HLPSE  Recent Labs     11/18/20  0310   INR 1.0   PTP 12.9   APTT 29.6      No results for input(s): FE, TIBC, PSAT, FERR in the last 72 hours. No results for input(s): PH, PCO2, PO2 in the last 72 hours. No results for input(s): CPK, CKMB in the last 72 hours.     No lab exists for component: TROPONINI  No results found for: GLUCPOC    Total Time: 25 minutes    Signed:  Lou Jimenez MD  11/19/2020  11:04 AM

## 2020-11-19 NOTE — ROUTINE PROCESS
TRANSFER - OUT REPORT: 
 
Verbal report given to [address] [place] [address] on [facility]  being transferred to [facility](unit) for routine post procedure Report consisted of patients Situation, Background, Assessment and  
Recommendations(SBAR). Information from the following report(s) was reviewed with the receiving nurse. Opportunity for questions and clarification was provided. Patient transported with: 
 Registered nurse, cardiac monitor

## 2020-11-19 NOTE — DISCHARGE INSTRUCTIONS

## 2020-11-19 NOTE — PROGRESS NOTES
Spoke with Dr. Rai Dempsey, patient needs to be discharge on 81 mg of ASA and 75 mg plavix and follow-up with Dr. Henrietta Salcido outpatient.

## 2020-11-19 NOTE — ROUTINE PROCESS
Bedside report given to Magnolia Pearce RN to assume care of pt. SBAR reviewed, sites viewed and assessed.

## 2020-11-19 NOTE — PROGRESS NOTES
Bedside shift change report given to Alek Ann RN (oncoming nurse) by Danielle Son RN (offgoing nurse). Report included the following information SBAR, ED Summary, Procedure Summary, MAR, Accordion and Cardiac Rhythm NSR.

## 2022-03-18 PROBLEM — I24.9 ACS (ACUTE CORONARY SYNDROME) (HCC): Status: ACTIVE | Noted: 2020-11-19

## 2022-03-18 PROBLEM — J44.9 CHRONIC OBSTRUCTIVE LUNG DISEASE (HCC): Status: ACTIVE | Noted: 2019-10-08

## 2022-03-18 PROBLEM — G47.33 OBSTRUCTIVE SLEEP APNEA OF ADULT: Status: ACTIVE | Noted: 2019-10-08

## 2022-03-18 PROBLEM — R07.9 CHEST PAIN: Status: ACTIVE | Noted: 2020-11-18

## 2022-03-19 PROBLEM — J30.9 ALLERGIC RHINITIS: Status: ACTIVE | Noted: 2019-10-08

## 2022-03-19 PROBLEM — K21.9 GASTROESOPHAGEAL REFLUX DISEASE WITHOUT ESOPHAGITIS: Status: ACTIVE | Noted: 2019-10-08

## 2022-03-19 PROBLEM — E66.9 OBESITY: Status: ACTIVE | Noted: 2019-10-08

## 2022-03-20 PROBLEM — E78.5 DYSLIPIDEMIA: Status: ACTIVE | Noted: 2019-10-08

## 2022-03-20 PROBLEM — I10 ESSENTIAL HYPERTENSION: Status: ACTIVE | Noted: 2019-10-08

## 2022-03-20 PROBLEM — I49.5 SICK SINUS SYNDROME (HCC): Status: ACTIVE | Noted: 2019-10-08

## 2024-02-07 ENCOUNTER — HOSPITAL ENCOUNTER (OUTPATIENT)
Facility: HOSPITAL | Age: 74
Discharge: HOME | End: 2024-02-10
Payer: MEDICARE

## 2024-02-07 VITALS
OXYGEN SATURATION: 96 % | TEMPERATURE: 97.9 F | HEART RATE: 98 BPM | HEIGHT: 67 IN | WEIGHT: 157.6 LBS | RESPIRATION RATE: 20 BRPM | BODY MASS INDEX: 24.74 KG/M2

## 2024-02-07 LAB
ALBUMIN SERPL-MCNC: 3.6 G/DL (ref 3.5–5)
ALBUMIN/GLOB SERPL: 1.1 (ref 1.1–2.2)
ALP SERPL-CCNC: 83 U/L (ref 45–117)
ALT SERPL-CCNC: 19 U/L (ref 12–78)
ANION GAP SERPL CALC-SCNC: 3 MMOL/L (ref 5–15)
APTT PPP: 28.9 SEC (ref 21.2–34.1)
AST SERPL W P-5'-P-CCNC: 10 U/L (ref 15–37)
BILIRUB SERPL-MCNC: 0.3 MG/DL (ref 0.2–1)
BUN SERPL-MCNC: 12 MG/DL (ref 6–20)
BUN/CREAT SERPL: 17 (ref 12–20)
CA-I BLD-MCNC: 8.4 MG/DL (ref 8.5–10.1)
CHLORIDE SERPL-SCNC: 109 MMOL/L (ref 97–108)
CO2 SERPL-SCNC: 28 MMOL/L (ref 21–32)
CREAT SERPL-MCNC: 0.7 MG/DL (ref 0.55–1.02)
ERYTHROCYTE [DISTWIDTH] IN BLOOD BY AUTOMATED COUNT: 19.3 % (ref 11.5–14.5)
GLOBULIN SER CALC-MCNC: 3.2 G/DL (ref 2–4)
GLUCOSE SERPL-MCNC: 124 MG/DL (ref 65–100)
HCT VFR BLD AUTO: 34.6 % (ref 35–47)
HGB BLD-MCNC: 10.2 G/DL (ref 11.5–16)
INR PPP: 0.9 (ref 0.9–1.1)
MCH RBC QN AUTO: 23 PG (ref 26–34)
MCHC RBC AUTO-ENTMCNC: 29.5 G/DL (ref 30–36.5)
MCV RBC AUTO: 78.1 FL (ref 80–99)
NRBC # BLD: 0 K/UL (ref 0–0.01)
NRBC BLD-RTO: 0 PER 100 WBC
PLATELET # BLD AUTO: 281 K/UL (ref 150–400)
PMV BLD AUTO: 9.2 FL (ref 8.9–12.9)
POTASSIUM SERPL-SCNC: 3.9 MMOL/L (ref 3.5–5.1)
PROT SERPL-MCNC: 6.8 G/DL (ref 6.4–8.2)
PROTHROMBIN TIME: 12.8 SEC (ref 11.9–14.6)
RBC # BLD AUTO: 4.43 M/UL (ref 3.8–5.2)
SODIUM SERPL-SCNC: 140 MMOL/L (ref 136–145)
THERAPEUTIC RANGE: NORMAL SEC (ref 82–109)
WBC # BLD AUTO: 9 K/UL (ref 3.6–11)

## 2024-02-07 PROCEDURE — 85730 THROMBOPLASTIN TIME PARTIAL: CPT

## 2024-02-07 PROCEDURE — 85027 COMPLETE CBC AUTOMATED: CPT

## 2024-02-07 PROCEDURE — 80053 COMPREHEN METABOLIC PANEL: CPT

## 2024-02-07 PROCEDURE — 36415 COLL VENOUS BLD VENIPUNCTURE: CPT

## 2024-02-07 PROCEDURE — 85610 PROTHROMBIN TIME: CPT

## 2024-02-07 RX ORDER — ATORVASTATIN CALCIUM 20 MG/1
20 TABLET, FILM COATED ORAL
COMMUNITY

## 2024-02-07 RX ORDER — VITAMIN B COMPLEX
1 TABLET ORAL DAILY
COMMUNITY

## 2024-02-07 RX ORDER — ASPIRIN 81 MG/1
81 TABLET ORAL DAILY
COMMUNITY

## 2024-02-07 RX ORDER — ZINC GLUCONATE 50 MG
50 TABLET ORAL DAILY
COMMUNITY

## 2024-02-07 RX ORDER — DIPHENHYDRAMINE HYDROCHLORIDE 25 MG/1
1 TABLET ORAL DAILY
COMMUNITY

## 2024-02-07 RX ORDER — TIOTROPIUM BROMIDE 18 UG/1
18 CAPSULE ORAL; RESPIRATORY (INHALATION) DAILY
COMMUNITY

## 2024-02-07 RX ORDER — NITROGLYCERIN 0.4 MG/1
0.4 TABLET SUBLINGUAL EVERY 5 MIN PRN
COMMUNITY

## 2024-02-07 RX ORDER — LORATADINE 10 MG/1
10 TABLET ORAL DAILY
COMMUNITY

## 2024-02-07 RX ORDER — ALBUTEROL SULFATE 90 UG/1
2 AEROSOL, METERED RESPIRATORY (INHALATION) EVERY 6 HOURS PRN
COMMUNITY

## 2024-02-07 RX ORDER — EZETIMIBE 10 MG/1
10 TABLET ORAL DAILY
COMMUNITY

## 2024-02-07 RX ORDER — PANTOPRAZOLE SODIUM 40 MG/1
40 TABLET, DELAYED RELEASE ORAL DAILY
COMMUNITY

## 2024-02-07 RX ORDER — ERGOCALCIFEROL 1.25 MG/1
50000 CAPSULE ORAL WEEKLY
COMMUNITY

## 2024-02-07 RX ORDER — RIBOFLAVIN (VITAMIN B2) 100 MG
50 TABLET ORAL DAILY
COMMUNITY

## 2024-02-07 RX ORDER — OLMESARTAN MEDOXOMIL 5 MG/1
5 TABLET ORAL DAILY PRN
COMMUNITY

## 2024-02-12 ENCOUNTER — ANESTHESIA (OUTPATIENT)
Facility: HOSPITAL | Age: 74
End: 2024-02-12
Payer: MEDICARE

## 2024-02-12 ENCOUNTER — HOSPITAL ENCOUNTER (OUTPATIENT)
Facility: HOSPITAL | Age: 74
Setting detail: OBSERVATION
Discharge: HOME | End: 2024-02-13
Attending: EMERGENCY MEDICINE | Admitting: HOSPITALIST
Payer: MEDICARE

## 2024-02-12 ENCOUNTER — ANESTHESIA EVENT (OUTPATIENT)
Facility: HOSPITAL | Age: 74
End: 2024-02-12
Payer: MEDICARE

## 2024-02-12 ENCOUNTER — APPOINTMENT (OUTPATIENT)
Facility: HOSPITAL | Age: 74
End: 2024-02-12
Payer: MEDICARE

## 2024-02-12 ENCOUNTER — HOSPITAL ENCOUNTER (OUTPATIENT)
Facility: HOSPITAL | Age: 74
Discharge: HOME | End: 2024-02-12
Attending: INTERNAL MEDICINE | Admitting: INTERNAL MEDICINE
Payer: MEDICARE

## 2024-02-12 VITALS
OXYGEN SATURATION: 100 % | RESPIRATION RATE: 18 BRPM | DIASTOLIC BLOOD PRESSURE: 60 MMHG | SYSTOLIC BLOOD PRESSURE: 122 MMHG | TEMPERATURE: 99.3 F | HEART RATE: 59 BPM

## 2024-02-12 DIAGNOSIS — R09.02 HYPOXEMIA: ICD-10-CM

## 2024-02-12 DIAGNOSIS — Z45.010 PACEMAKER GENERATOR END OF LIFE: ICD-10-CM

## 2024-02-12 DIAGNOSIS — I95.9 TRANSIENT HYPOTENSION: Primary | ICD-10-CM

## 2024-02-12 DIAGNOSIS — J44.1 COPD EXACERBATION (HCC): ICD-10-CM

## 2024-02-12 PROBLEM — Z45.02 AICD AT END OF BATTERY LIFE: Status: ACTIVE | Noted: 2024-02-12

## 2024-02-12 LAB
ALBUMIN SERPL-MCNC: 3.3 G/DL (ref 3.5–5)
ALBUMIN/GLOB SERPL: 1 (ref 1.1–2.2)
ALP SERPL-CCNC: 71 U/L (ref 45–117)
ALT SERPL-CCNC: 13 U/L (ref 12–78)
ANION GAP SERPL CALC-SCNC: 5 MMOL/L (ref 5–15)
AST SERPL W P-5'-P-CCNC: 11 U/L (ref 15–37)
BASOPHILS # BLD: 0 K/UL (ref 0–0.1)
BASOPHILS NFR BLD: 0 % (ref 0–1)
BILIRUB SERPL-MCNC: 0.4 MG/DL (ref 0.2–1)
BUN SERPL-MCNC: 8 MG/DL (ref 6–20)
BUN/CREAT SERPL: 13 (ref 12–20)
CA-I BLD-MCNC: 8.5 MG/DL (ref 8.5–10.1)
CHLORIDE SERPL-SCNC: 110 MMOL/L (ref 97–108)
CO2 SERPL-SCNC: 26 MMOL/L (ref 21–32)
CREAT SERPL-MCNC: 0.62 MG/DL (ref 0.55–1.02)
DIFFERENTIAL METHOD BLD: ABNORMAL
EOSINOPHIL # BLD: 0.4 K/UL (ref 0–0.4)
EOSINOPHIL NFR BLD: 3 % (ref 0–7)
ERYTHROCYTE [DISTWIDTH] IN BLOOD BY AUTOMATED COUNT: 19.7 % (ref 11.5–14.5)
GLOBULIN SER CALC-MCNC: 3.4 G/DL (ref 2–4)
GLUCOSE SERPL-MCNC: 117 MG/DL (ref 65–100)
HCT VFR BLD AUTO: 31.4 % (ref 35–47)
HGB BLD-MCNC: 9.1 G/DL (ref 11.5–16)
IMM GRANULOCYTES # BLD AUTO: 0.1 K/UL (ref 0–0.04)
IMM GRANULOCYTES NFR BLD AUTO: 0 % (ref 0–0.5)
LYMPHOCYTES # BLD: 1.4 K/UL (ref 0.8–3.5)
LYMPHOCYTES NFR BLD: 11 % (ref 12–49)
MAGNESIUM SERPL-MCNC: 2 MG/DL (ref 1.6–2.4)
MCH RBC QN AUTO: 22.8 PG (ref 26–34)
MCHC RBC AUTO-ENTMCNC: 29 G/DL (ref 30–36.5)
MCV RBC AUTO: 78.7 FL (ref 80–99)
MONOCYTES # BLD: 1 K/UL (ref 0–1)
MONOCYTES NFR BLD: 8 % (ref 5–13)
NEUTS SEG # BLD: 9.6 K/UL (ref 1.8–8)
NEUTS SEG NFR BLD: 78 % (ref 32–75)
NRBC # BLD: 0 K/UL (ref 0–0.01)
NRBC BLD-RTO: 0 PER 100 WBC
PLATELET # BLD AUTO: 225 K/UL (ref 150–400)
PMV BLD AUTO: 9.6 FL (ref 8.9–12.9)
POTASSIUM SERPL-SCNC: 3.4 MMOL/L (ref 3.5–5.1)
PROT SERPL-MCNC: 6.7 G/DL (ref 6.4–8.2)
RBC # BLD AUTO: 3.99 M/UL (ref 3.8–5.2)
SODIUM SERPL-SCNC: 141 MMOL/L (ref 136–145)
WBC # BLD AUTO: 12.5 K/UL (ref 3.6–11)

## 2024-02-12 PROCEDURE — 99152 MOD SED SAME PHYS/QHP 5/>YRS: CPT | Performed by: INTERNAL MEDICINE

## 2024-02-12 PROCEDURE — 71045 X-RAY EXAM CHEST 1 VIEW: CPT

## 2024-02-12 PROCEDURE — 99285 EMERGENCY DEPT VISIT HI MDM: CPT

## 2024-02-12 PROCEDURE — 2580000003 HC RX 258: Performed by: EMERGENCY MEDICINE

## 2024-02-12 PROCEDURE — 93005 ELECTROCARDIOGRAM TRACING: CPT | Performed by: STUDENT IN AN ORGANIZED HEALTH CARE EDUCATION/TRAINING PROGRAM

## 2024-02-12 PROCEDURE — C1785 PMKR, DUAL, RATE-RESP: HCPCS | Performed by: INTERNAL MEDICINE

## 2024-02-12 PROCEDURE — 99153 MOD SED SAME PHYS/QHP EA: CPT | Performed by: INTERNAL MEDICINE

## 2024-02-12 PROCEDURE — 80053 COMPREHEN METABOLIC PANEL: CPT

## 2024-02-12 PROCEDURE — 85025 COMPLETE CBC W/AUTO DIFF WBC: CPT

## 2024-02-12 PROCEDURE — 83735 ASSAY OF MAGNESIUM: CPT

## 2024-02-12 PROCEDURE — 33208 INSRT HEART PM ATRIAL & VENT: CPT | Performed by: INTERNAL MEDICINE

## 2024-02-12 PROCEDURE — 6360000002 HC RX W HCPCS: Performed by: INTERNAL MEDICINE

## 2024-02-12 PROCEDURE — 2709999900 HC NON-CHARGEABLE SUPPLY: Performed by: INTERNAL MEDICINE

## 2024-02-12 PROCEDURE — 94640 AIRWAY INHALATION TREATMENT: CPT

## 2024-02-12 PROCEDURE — 36415 COLL VENOUS BLD VENIPUNCTURE: CPT

## 2024-02-12 PROCEDURE — 6370000000 HC RX 637 (ALT 250 FOR IP): Performed by: EMERGENCY MEDICINE

## 2024-02-12 PROCEDURE — C1769 GUIDE WIRE: HCPCS | Performed by: INTERNAL MEDICINE

## 2024-02-12 PROCEDURE — 2500000003 HC RX 250 WO HCPCS: Performed by: INTERNAL MEDICINE

## 2024-02-12 PROCEDURE — 2720000010 HC SURG SUPPLY STERILE: Performed by: INTERNAL MEDICINE

## 2024-02-12 PROCEDURE — 2580000003 HC RX 258: Performed by: INTERNAL MEDICINE

## 2024-02-12 PROCEDURE — 7100000010 HC PHASE II RECOVERY - FIRST 15 MIN: Performed by: INTERNAL MEDICINE

## 2024-02-12 PROCEDURE — 7100000011 HC PHASE II RECOVERY - ADDTL 15 MIN: Performed by: INTERNAL MEDICINE

## 2024-02-12 DEVICE — IPG W1DR01 AZURE XT DR MRI USA
Type: IMPLANTABLE DEVICE | Status: FUNCTIONAL
Brand: AZURE™ XT DR MRI SURESCAN™

## 2024-02-12 RX ORDER — NITROFURANTOIN 25; 75 MG/1; MG/1
100 CAPSULE ORAL
Status: ACTIVE | OUTPATIENT
Start: 2024-02-12 | End: 2024-02-13

## 2024-02-12 RX ORDER — METHYLPREDNISOLONE SODIUM SUCCINATE 125 MG/2ML
80 INJECTION, POWDER, LYOPHILIZED, FOR SOLUTION INTRAMUSCULAR; INTRAVENOUS
Status: COMPLETED | OUTPATIENT
Start: 2024-02-12 | End: 2024-02-13

## 2024-02-12 RX ORDER — MIDAZOLAM HYDROCHLORIDE 1 MG/ML
INJECTION INTRAMUSCULAR; INTRAVENOUS PRN
Status: DISCONTINUED | OUTPATIENT
Start: 2024-02-12 | End: 2024-02-12 | Stop reason: HOSPADM

## 2024-02-12 RX ORDER — 0.9 % SODIUM CHLORIDE 0.9 %
1000 INTRAVENOUS SOLUTION INTRAVENOUS ONCE
Status: COMPLETED | OUTPATIENT
Start: 2024-02-12 | End: 2024-02-12

## 2024-02-12 RX ORDER — FENTANYL CITRATE 50 UG/ML
INJECTION, SOLUTION INTRAMUSCULAR; INTRAVENOUS PRN
Status: DISCONTINUED | OUTPATIENT
Start: 2024-02-12 | End: 2024-02-12 | Stop reason: HOSPADM

## 2024-02-12 RX ORDER — SODIUM CHLORIDE 9 MG/ML
INJECTION, SOLUTION INTRAVENOUS CONTINUOUS
Status: DISCONTINUED | OUTPATIENT
Start: 2024-02-12 | End: 2024-02-12 | Stop reason: HOSPADM

## 2024-02-12 RX ORDER — IPRATROPIUM BROMIDE AND ALBUTEROL SULFATE 2.5; .5 MG/3ML; MG/3ML
1 SOLUTION RESPIRATORY (INHALATION)
Status: COMPLETED | OUTPATIENT
Start: 2024-02-12 | End: 2024-02-12

## 2024-02-12 RX ORDER — LIDOCAINE HYDROCHLORIDE AND EPINEPHRINE BITARTRATE 20; .01 MG/ML; MG/ML
INJECTION, SOLUTION SUBCUTANEOUS PRN
Status: DISCONTINUED | OUTPATIENT
Start: 2024-02-12 | End: 2024-02-12 | Stop reason: HOSPADM

## 2024-02-12 RX ADMIN — IPRATROPIUM BROMIDE AND ALBUTEROL SULFATE 1 DOSE: 2.5; .5 SOLUTION RESPIRATORY (INHALATION) at 23:59

## 2024-02-12 RX ADMIN — SODIUM CHLORIDE 1000 ML: 9 INJECTION, SOLUTION INTRAVENOUS at 21:49

## 2024-02-12 ASSESSMENT — LIFESTYLE VARIABLES
HOW MANY STANDARD DRINKS CONTAINING ALCOHOL DO YOU HAVE ON A TYPICAL DAY: PATIENT DOES NOT DRINK
HOW OFTEN DO YOU HAVE A DRINK CONTAINING ALCOHOL: NEVER

## 2024-02-12 ASSESSMENT — PAIN - FUNCTIONAL ASSESSMENT
PAIN_FUNCTIONAL_ASSESSMENT: NONE - DENIES PAIN
PAIN_FUNCTIONAL_ASSESSMENT: 0-10
PAIN_FUNCTIONAL_ASSESSMENT: 0-10
PAIN_FUNCTIONAL_ASSESSMENT: NONE - DENIES PAIN

## 2024-02-12 NOTE — ANESTHESIA PRE PROCEDURE
Department of Anesthesiology  Preprocedure Note       Name:  Cristian Chandra   Age:  73 y.o.  :  1950                                          MRN:  [id number]         Date:  2024      Surgeon: Surgeon(s):  Leobardo Zamora MD    Procedure: Procedure(s):  Remove & replace PPM gen dual lead    Medications prior to admission:   Prior to Admission medications    Medication Sig Start Date End Date Taking? Authorizing Provider   albuterol sulfate HFA (VENTOLIN HFA) 108 (90 Base) MCG/ACT inhaler Inhale 2 puffs into the lungs every 6 hours as needed for Wheezing    Michael Franklin MD   aspirin 81 MG EC tablet Take 1 tablet by mouth daily Last taken 24 for cardiac procedure    Michael Franklin MD   atorvastatin (LIPITOR) 20 MG tablet Take 1 tablet by mouth three times a week    Michael Franklin MD   Biotin (BIOTIN 5000) 5 MG CAPS Take 1 capsule by mouth daily    Michael Franklin MD   Coenzyme Q10 (COQ10) 100 MG CAPS Take 1 capsule by mouth daily    Michael Franklin MD   mometasone-formoterol (DULERA) 100-5 MCG/ACT inhaler Inhale 2 puffs into the lungs 2 times daily    Michael Franklin MD   ezetimibe (ZETIA) 10 MG tablet Take 1 tablet by mouth daily    Michael Franklin MD   MAGNESIUM GLYCINATE PO Take 100 mg by mouth daily    Michael Franklin MD   nitroGLYCERIN (NITROSTAT) 0.4 MG SL tablet Place 1 tablet under the tongue every 5 minutes as needed for Chest pain up to max of 3 total doses. If no relief after 1 dose, call 911.    Michael Franklin MD   olmesartan (BENICAR) 5 MG tablet Take 1 tablet by mouth daily as needed    Michael Franklin MD   Calcium-Phosphorus-Vitamin D (CITRACAL +D3 PO) Take 1 capsule by mouth daily    Michael Franklin MD   pantoprazole (PROTONIX) 40 MG tablet Take 1 tablet by mouth daily    Michael Franklin MD   tiotropium (SPIRIVA) 18 MCG inhalation capsule Inhale 1 capsule into the lungs daily    Michael Franklin

## 2024-02-12 NOTE — DISCHARGE INSTRUCTIONS
SHOWER TOMORROW   KEEP DRESSING CLEAN DRY AND INTACT   TYLENOL FOR PAIN   ICE PACK FOR   4  HOURS

## 2024-02-12 NOTE — PROGRESS NOTES
DRESSING LEFT CHEST DRY AND INTACT , IV DC'D SITE INTACT NO SWELLING NOTED , DISCHARGE INSTRUCTIONS GIVEN TO PT AND PT'S  KARISSA , BOTH VERBALIZED UNDERSTANDING , NO DISTRESS NOTED

## 2024-02-13 VITALS
TEMPERATURE: 98.4 F | SYSTOLIC BLOOD PRESSURE: 120 MMHG | DIASTOLIC BLOOD PRESSURE: 84 MMHG | OXYGEN SATURATION: 94 % | RESPIRATION RATE: 20 BRPM | HEART RATE: 89 BPM

## 2024-02-13 PROBLEM — J44.9 COPD (CHRONIC OBSTRUCTIVE PULMONARY DISEASE) (HCC): Status: ACTIVE | Noted: 2024-02-13

## 2024-02-13 LAB
ANION GAP SERPL CALC-SCNC: 4 MMOL/L (ref 5–15)
APPEARANCE UR: CLEAR
BACTERIA URNS QL MICRO: NEGATIVE /HPF
BILIRUB UR QL: NEGATIVE
BUN SERPL-MCNC: 7 MG/DL (ref 6–20)
BUN/CREAT SERPL: 11 (ref 12–20)
CA-I BLD-MCNC: 8.5 MG/DL (ref 8.5–10.1)
CHLORIDE SERPL-SCNC: 114 MMOL/L (ref 97–108)
CO2 SERPL-SCNC: 23 MMOL/L (ref 21–32)
COLOR UR: ABNORMAL
CREAT SERPL-MCNC: 0.62 MG/DL (ref 0.55–1.02)
EKG ATRIAL RATE: 99 BPM
EKG DIAGNOSIS: NORMAL
EKG P AXIS: 39 DEGREES
EKG P-R INTERVAL: 200 MS
EKG Q-T INTERVAL: 350 MS
EKG QRS DURATION: 84 MS
EKG QTC CALCULATION (BAZETT): 449 MS
EKG R AXIS: -10 DEGREES
EKG T AXIS: 59 DEGREES
EKG VENTRICULAR RATE: 99 BPM
EPITH CASTS URNS QL MICRO: ABNORMAL /LPF
ERYTHROCYTE [DISTWIDTH] IN BLOOD BY AUTOMATED COUNT: 19.7 % (ref 11.5–14.5)
EST. AVERAGE GLUCOSE BLD GHB EST-MCNC: 117 MG/DL
GLUCOSE SERPL-MCNC: 166 MG/DL (ref 65–100)
GLUCOSE UR STRIP.AUTO-MCNC: NEGATIVE MG/DL
HBA1C MFR BLD: 5.7 % (ref 4–5.6)
HCT VFR BLD AUTO: 30.3 % (ref 35–47)
HGB BLD-MCNC: 8.7 G/DL (ref 11.5–16)
HGB UR QL STRIP: NEGATIVE
KETONES UR QL STRIP.AUTO: NEGATIVE MG/DL
LEUKOCYTE ESTERASE UR QL STRIP.AUTO: NEGATIVE
MCH RBC QN AUTO: 22.7 PG (ref 26–34)
MCHC RBC AUTO-ENTMCNC: 28.7 G/DL (ref 30–36.5)
MCV RBC AUTO: 78.9 FL (ref 80–99)
MUCOUS THREADS URNS QL MICRO: NEGATIVE /LPF
NITRITE UR QL STRIP.AUTO: NEGATIVE
NRBC # BLD: 0 K/UL (ref 0–0.01)
NRBC BLD-RTO: 0 PER 100 WBC
PH UR STRIP: 6 (ref 5–8)
PLATELET # BLD AUTO: 220 K/UL (ref 150–400)
PMV BLD AUTO: 9.6 FL (ref 8.9–12.9)
POTASSIUM SERPL-SCNC: 4.2 MMOL/L (ref 3.5–5.1)
PROT UR STRIP-MCNC: NEGATIVE MG/DL
RBC # BLD AUTO: 3.84 M/UL (ref 3.8–5.2)
RBC #/AREA URNS HPF: ABNORMAL /HPF (ref 0–5)
SODIUM SERPL-SCNC: 141 MMOL/L (ref 136–145)
URINE CULTURE IF INDICATED: ABNORMAL
UROBILINOGEN UR QL STRIP.AUTO: 2 EU/DL (ref 0.1–1)
WBC # BLD AUTO: 9.6 K/UL (ref 3.6–11)
WBC URNS QL MICRO: ABNORMAL /HPF (ref 0–4)

## 2024-02-13 PROCEDURE — 96375 TX/PRO/DX INJ NEW DRUG ADDON: CPT

## 2024-02-13 PROCEDURE — G0378 HOSPITAL OBSERVATION PER HR: HCPCS

## 2024-02-13 PROCEDURE — 94640 AIRWAY INHALATION TREATMENT: CPT

## 2024-02-13 PROCEDURE — 96376 TX/PRO/DX INJ SAME DRUG ADON: CPT

## 2024-02-13 PROCEDURE — 83036 HEMOGLOBIN GLYCOSYLATED A1C: CPT

## 2024-02-13 PROCEDURE — 2580000003 HC RX 258: Performed by: HOSPITALIST

## 2024-02-13 PROCEDURE — 6370000000 HC RX 637 (ALT 250 FOR IP): Performed by: EMERGENCY MEDICINE

## 2024-02-13 PROCEDURE — 6370000000 HC RX 637 (ALT 250 FOR IP): Performed by: HOSPITALIST

## 2024-02-13 PROCEDURE — 80048 BASIC METABOLIC PNL TOTAL CA: CPT

## 2024-02-13 PROCEDURE — 96372 THER/PROPH/DIAG INJ SC/IM: CPT

## 2024-02-13 PROCEDURE — 81001 URINALYSIS AUTO W/SCOPE: CPT

## 2024-02-13 PROCEDURE — 6360000002 HC RX W HCPCS: Performed by: HOSPITALIST

## 2024-02-13 PROCEDURE — 6360000002 HC RX W HCPCS: Performed by: EMERGENCY MEDICINE

## 2024-02-13 PROCEDURE — 85027 COMPLETE CBC AUTOMATED: CPT

## 2024-02-13 PROCEDURE — 96374 THER/PROPH/DIAG INJ IV PUSH: CPT

## 2024-02-13 PROCEDURE — 94761 N-INVAS EAR/PLS OXIMETRY MLT: CPT

## 2024-02-13 PROCEDURE — 2700000000 HC OXYGEN THERAPY PER DAY

## 2024-02-13 RX ORDER — IPRATROPIUM BROMIDE AND ALBUTEROL SULFATE 2.5; .5 MG/3ML; MG/3ML
1 SOLUTION RESPIRATORY (INHALATION)
Status: DISCONTINUED | OUTPATIENT
Start: 2024-02-13 | End: 2024-02-13

## 2024-02-13 RX ORDER — METHYLPREDNISOLONE SODIUM SUCCINATE 40 MG/ML
40 INJECTION, POWDER, LYOPHILIZED, FOR SOLUTION INTRAMUSCULAR; INTRAVENOUS DAILY
Status: DISCONTINUED | OUTPATIENT
Start: 2024-02-13 | End: 2024-02-13 | Stop reason: HOSPADM

## 2024-02-13 RX ORDER — ASPIRIN 81 MG/1
81 TABLET ORAL DAILY
Status: CANCELLED | OUTPATIENT
Start: 2024-02-13

## 2024-02-13 RX ORDER — POTASSIUM CHLORIDE 20 MEQ/1
40 TABLET, EXTENDED RELEASE ORAL PRN
Status: DISCONTINUED | OUTPATIENT
Start: 2024-02-13 | End: 2024-02-13 | Stop reason: HOSPADM

## 2024-02-13 RX ORDER — PANTOPRAZOLE SODIUM 40 MG/1
40 TABLET, DELAYED RELEASE ORAL DAILY
Status: CANCELLED | OUTPATIENT
Start: 2024-02-13

## 2024-02-13 RX ORDER — ACETAMINOPHEN 325 MG/1
650 TABLET ORAL EVERY 6 HOURS PRN
Status: DISCONTINUED | OUTPATIENT
Start: 2024-02-13 | End: 2024-02-13 | Stop reason: HOSPADM

## 2024-02-13 RX ORDER — SODIUM CHLORIDE 0.9 % (FLUSH) 0.9 %
5-40 SYRINGE (ML) INJECTION EVERY 12 HOURS SCHEDULED
Status: DISCONTINUED | OUTPATIENT
Start: 2024-02-13 | End: 2024-02-13 | Stop reason: HOSPADM

## 2024-02-13 RX ORDER — CETIRIZINE HYDROCHLORIDE 10 MG/1
5 TABLET ORAL DAILY
Status: CANCELLED | OUTPATIENT
Start: 2024-02-13

## 2024-02-13 RX ORDER — MAGNESIUM SULFATE IN WATER 40 MG/ML
2000 INJECTION, SOLUTION INTRAVENOUS PRN
Status: DISCONTINUED | OUTPATIENT
Start: 2024-02-13 | End: 2024-02-13 | Stop reason: HOSPADM

## 2024-02-13 RX ORDER — ONDANSETRON 4 MG/1
4 TABLET, ORALLY DISINTEGRATING ORAL EVERY 8 HOURS PRN
Status: DISCONTINUED | OUTPATIENT
Start: 2024-02-13 | End: 2024-02-13 | Stop reason: HOSPADM

## 2024-02-13 RX ORDER — SODIUM CHLORIDE 9 MG/ML
INJECTION, SOLUTION INTRAVENOUS PRN
Status: DISCONTINUED | OUTPATIENT
Start: 2024-02-13 | End: 2024-02-13 | Stop reason: HOSPADM

## 2024-02-13 RX ORDER — EZETIMIBE 10 MG/1
10 TABLET ORAL DAILY
Status: CANCELLED | OUTPATIENT
Start: 2024-02-13

## 2024-02-13 RX ORDER — DIPHENHYDRAMINE HYDROCHLORIDE 25 MG/1
1 TABLET ORAL DAILY
Status: CANCELLED | OUTPATIENT
Start: 2024-02-13

## 2024-02-13 RX ORDER — CEFDINIR 300 MG/1
300 CAPSULE ORAL EVERY 12 HOURS SCHEDULED
Status: DISCONTINUED | OUTPATIENT
Start: 2024-02-13 | End: 2024-02-13 | Stop reason: HOSPADM

## 2024-02-13 RX ORDER — ZINC GLUCONATE 50 MG
50 TABLET ORAL DAILY
Status: CANCELLED | OUTPATIENT
Start: 2024-02-13

## 2024-02-13 RX ORDER — POLYETHYLENE GLYCOL 3350 17 G/17G
17 POWDER, FOR SOLUTION ORAL DAILY PRN
Status: DISCONTINUED | OUTPATIENT
Start: 2024-02-13 | End: 2024-02-13 | Stop reason: HOSPADM

## 2024-02-13 RX ORDER — ATORVASTATIN CALCIUM 20 MG/1
20 TABLET, FILM COATED ORAL
Status: CANCELLED | OUTPATIENT
Start: 2024-02-13

## 2024-02-13 RX ORDER — ONDANSETRON 2 MG/ML
4 INJECTION INTRAMUSCULAR; INTRAVENOUS EVERY 6 HOURS PRN
Status: DISCONTINUED | OUTPATIENT
Start: 2024-02-13 | End: 2024-02-13 | Stop reason: HOSPADM

## 2024-02-13 RX ORDER — ENOXAPARIN SODIUM 100 MG/ML
40 INJECTION SUBCUTANEOUS DAILY
Status: DISCONTINUED | OUTPATIENT
Start: 2024-02-13 | End: 2024-02-13 | Stop reason: HOSPADM

## 2024-02-13 RX ORDER — POTASSIUM CHLORIDE 20 MEQ/1
40 TABLET, EXTENDED RELEASE ORAL ONCE
Status: COMPLETED | OUTPATIENT
Start: 2024-02-13 | End: 2024-02-13

## 2024-02-13 RX ORDER — SODIUM CHLORIDE 0.9 % (FLUSH) 0.9 %
10 SYRINGE (ML) INJECTION PRN
Status: DISCONTINUED | OUTPATIENT
Start: 2024-02-13 | End: 2024-02-13 | Stop reason: HOSPADM

## 2024-02-13 RX ORDER — ACETAMINOPHEN 650 MG/1
650 SUPPOSITORY RECTAL EVERY 6 HOURS PRN
Status: DISCONTINUED | OUTPATIENT
Start: 2024-02-13 | End: 2024-02-13 | Stop reason: HOSPADM

## 2024-02-13 RX ORDER — LOSARTAN POTASSIUM 50 MG/1
25 TABLET ORAL DAILY
Status: CANCELLED | OUTPATIENT
Start: 2024-02-13

## 2024-02-13 RX ORDER — IPRATROPIUM BROMIDE AND ALBUTEROL SULFATE 2.5; .5 MG/3ML; MG/3ML
1 SOLUTION RESPIRATORY (INHALATION)
Status: DISCONTINUED | OUTPATIENT
Start: 2024-02-13 | End: 2024-02-13 | Stop reason: HOSPADM

## 2024-02-13 RX ORDER — IPRATROPIUM BROMIDE AND ALBUTEROL SULFATE 2.5; .5 MG/3ML; MG/3ML
3 SOLUTION RESPIRATORY (INHALATION)
Qty: 360 ML | Refills: 0 | Status: SHIPPED | OUTPATIENT
Start: 2024-02-13 | End: 2024-03-14

## 2024-02-13 RX ORDER — POTASSIUM CHLORIDE 7.45 MG/ML
10 INJECTION INTRAVENOUS PRN
Status: DISCONTINUED | OUTPATIENT
Start: 2024-02-13 | End: 2024-02-13 | Stop reason: HOSPADM

## 2024-02-13 RX ADMIN — METHYLPREDNISOLONE SODIUM SUCCINATE 40 MG: 40 INJECTION INTRAMUSCULAR; INTRAVENOUS at 10:11

## 2024-02-13 RX ADMIN — IPRATROPIUM BROMIDE AND ALBUTEROL SULFATE 1 DOSE: .5; 3 SOLUTION RESPIRATORY (INHALATION) at 08:49

## 2024-02-13 RX ADMIN — POTASSIUM CHLORIDE 40 MEQ: 1500 TABLET, EXTENDED RELEASE ORAL at 02:39

## 2024-02-13 RX ADMIN — ENOXAPARIN SODIUM 40 MG: 100 INJECTION SUBCUTANEOUS at 10:11

## 2024-02-13 RX ADMIN — SODIUM CHLORIDE, PRESERVATIVE FREE 10 ML: 5 INJECTION INTRAVENOUS at 10:16

## 2024-02-13 RX ADMIN — METHYLPREDNISOLONE SODIUM SUCCINATE 80 MG: 125 INJECTION INTRAMUSCULAR; INTRAVENOUS at 00:19

## 2024-02-13 RX ADMIN — CEFTRIAXONE SODIUM 1000 MG: 1 INJECTION, POWDER, FOR SOLUTION INTRAMUSCULAR; INTRAVENOUS at 03:37

## 2024-02-13 RX ADMIN — CEFDINIR 300 MG: 300 CAPSULE ORAL at 10:11

## 2024-02-13 ASSESSMENT — PAIN - FUNCTIONAL ASSESSMENT: PAIN_FUNCTIONAL_ASSESSMENT: NONE - DENIES PAIN

## 2024-02-13 NOTE — H&P
HISTORY AND PHYSICAL  (Hospitalist, Internal Medicine)  IDENTIFYING INFORMATION   PATIENT:  Cristian Chandra  MRN:  [id number]  ADMIT DATE: 2/12/2024  TIME OF EVALUATION: 2/13/2024 1:08 AM    CHIEF COMPLAINT     Shortness of breath    HISTORY OF PRESENT ILLNESS   Cristian Chandra is a 73 y.o. female presents with shortness of breath.  She came in for an outpatient procedure to have her pacemaker replaced.  Apparently the procedure has been postponed to later part of the day and she had gone the whole day without any p.o. intake.  At the end of her procedure, she was complaining of being lightheaded short of breath.  She has COPD but is not on oxygen at home.  She was noted to become hypoxic with SaO2 of 92% on room air and dropping down to 86%.  She denies fever, chills, nausea, vomiting, headache, palpitations or abdominal pain  She had been diagnosed with UTI earlier and had been on treatment with nitrofurantoin.  She is currently asking for replacement of the nitrofurantoin      PAST MEDICAL, SURGICAL, FAMILY, and SOCIAL HISTORY     Past Medical History:   Diagnosis Date    Cataracts, bilateral     had surgery    Chronic anemia     Chronic obstructive pulmonary disease (HCC)     GERD (gastroesophageal reflux disease)     High cholesterol     Hypertension     Hypertension     Lung nodules     gets CT scan every year    Obesity     MARS (obstructive sleep apnea)     doesn't wear CPAP    Pacemaker     Prediabetes     Sick sinus syndrome (HCC)     Uterine prolapse      Past Surgical History:   Procedure Laterality Date    ABDOMINAL AORTIC ANEURYSM REPAIR      BASAL CELL CARCINOMA EXCISION      nose, ear    CHOLECYSTECTOMY      COLONOSCOPY      CYST REMOVAL      INSERTABLE CARDIAC MONITOR      PACEMAKER      PACEMAKER PLACEMENT      TUMOR REMOVAL      Right neck, benign results    VAGINAL PROLAPSE REPAIR       Family History   Problem Relation Age of Onset    Leukemia Mother     Heart Failure Father     Coronary Art Dis  mouth daily      zinc gluconate 50 MG tablet Take 1 tablet by mouth daily      ergocalciferol (ERGOCALCIFEROL) 1.25 MG (91384 UT) capsule Take 1 capsule by mouth once a week wednesdays      loratadine (CLARITIN) 10 MG tablet Take 1 tablet by mouth daily           Allergies  Allergies   Allergen Reactions    Statins Other (See Comments)     Muscle cramps         REVIEW OF SYSTEMS   Within above limitations. 14 point review of systems reviewed. Pertinent positive or negative as per HPI or otherwise negative per 14 point systems review.      PHYSICAL EXAM     Wt Readings from Last 3 Encounters:   02/07/24 71.5 kg (157 lb 9.6 oz)       Blood pressure (!) 119/53, pulse 96, temperature 98.5 °F (36.9 °C), temperature source Oral, resp. rate 24, SpO2 94 %.    General: Patient is awake, alert, oriented with appropriate mood and affect  HEENT: Atraumatic, no cephalic, pupils equal equally reactive to light.  Mucous membranes moist  Heart: First and second heart sounds present  Lungs: Air entry adequate  Abdomen: Soft, nontender, nondistended.  Bowel sounds present with regular frequency  Genitals: Deferred  Skin:  Extremities: Bilateral pedal edema, pedal pulses palpable with good volume.  CNS: Cranial nerves II to XII intact with no focal neurological deficit.    Lines/Drains/Airways/Wounds:  [unfilled]    LABS AND IMAGING   CBC  [unfilled]    Last 3 Hemoglobin  Lab Results   Component Value Date/Time    HGB 9.1 02/12/2024 09:22 PM    HGB 10.2 02/07/2024 02:39 PM    HGB 10.0 11/18/2020 06:35 PM     Last 3 WBC/ANC  Lab Results   Component Value Date/Time    WBC 12.5 02/12/2024 09:22 PM    WBC 9.0 02/07/2024 02:39 PM    WBC 9.9 11/18/2020 06:35 PM     No components found for: \"GRNLOCTYABS\"  Last 3 Platelets  No results found for: \"PLATELET\"  Chemistry  [unfilled]  [unfilled]  No results found for: \"LDH\"  Coagulation Studies  Lab Results   Component Value Date/Time    INR 0.9 02/07/2024 02:39 PM     Liver Function

## 2024-02-13 NOTE — ED TRIAGE NOTES
Patient presents after sitting for extended period of time after battery replacement for pacemaker, states has not eaten since yesterday; c/o increasing shortness of breath and fatigue

## 2024-02-13 NOTE — DISCHARGE SUMMARY
Hospital Medicine Discharge Summary    Patient: Cristian Chandra   : 1950     Admit Date: 2024   Discharge Date:   2024  Disposition:  [x]Home   []HHC  []SNF  []ECF  []Acute Rehab  []LTAC  []Hospice  Code status:  [x]Full  []DNR/CCA  []Limited (DNR/CCA with Do Not Intubate)  []DNRCC  Condition at Discharge: Stable  Primary Care Provider: Trav Thompson MD    Admitting Provider: Luís Diaz MD  Discharge Provider: Ramin Nguyễn MD     Discharge Diagnoses: Hypoxia    Active Hospital Problems    Diagnosis     COPD (chronic obstructive pulmonary disease) ([facility]) [J44.9]        Discharge summary  This is a 73-year-old female with past medical history of hypertension, sick sinus syndrome s/p pacemaker, obstructive sleep apnea, COPD, anemia, hyperlipidemia who presented to the emergency department after she was found laying be in respiratory distress after pacemaker placement.  Patient was treated with nebulizers and given steroids.  She has been off of oxygen doing well.  Did walking desat test with no desaturation oxygen saturation maintaining 92-94.  Would hold off of steroids.  Patient to continue her bronchodilators long-acting and short acting at home.  Prescribed nebulizers.  Patient to follow-up with her primary care physician in the outpatient    Physical Exam Performed:      /76   Pulse (!) 106   Temp 98.5 °F (36.9 °C) (Oral)   Resp 15   SpO2 92% Comment: ambulatory pulse ox      General appearance:  No apparent distress, appears stated age and cooperative.  Respiratory: Slight expiratory wheeze  Cardiovascular:  Regular rate and rhythm.  Abdomen:  Soft, non-tender, non-distended.  Musculoskeletal:  No edema  Neurologic:  Non-focal  Psychiatric:  Alert and oriented    Patient Discharge Instructions:      Follow up:    1.  Primary Care Provider Trav Thompson MD in the next 1-2 weeks.      The patient was seen and examined on day of discharge and this discharge  7.5 mV Ventricular capture threshold not measured due to EOS Ventricular lead impedance: 608 ohms The expected battery replacement voltage 2.81V, current battery voltage 2.74 V.  Pacemaker generator reached EOS Cardiac arrhythmias: AT/AF load : 0%. Impression: 1.  Pacemaker has reached EOS and patient now is being scheduled for pacemaker generator change ASAP at [facility].      Consults:     IP CONSULT TO HOSPITALIST    Labs:     Recent Labs     02/12/24 2122 02/13/24  0551   WBC 12.5* 9.6   HGB 9.1* 8.7*   HCT 31.4* 30.3*    220     Recent Labs     02/12/24 2122 02/13/24  0551    141   K 3.4* 4.2   * 114*   CO2 26 23   BUN 8 7   CREATININE 0.62 0.62   CALCIUM 8.5 8.5   MG 2.0  --      No results for input(s): \"PROBNP\", \"TROPHS\" in the last 72 hours.  No results for input(s): \"LABA1C\" in the last 72 hours.  Recent Labs     02/12/24 2122   AST 11*   ALT 13   BILITOT 0.4   ALKPHOS 71     No results for input(s): \"INR\", \"LACTA\", \"TSH\" in the last 72 hours.    Urine Cultures: No results found for: \"LABURIN\"  Blood Cultures: No results found for: \"BC\"  No results found for: \"BLOODCULT2\"  Organism: No results found for: \"ORG\"    Signed:    Ramin Nguyễn MD

## 2024-02-13 NOTE — DISCHARGE INSTRUCTIONS
Follow-up with your primary care physician in the outpatient.  Continue to finish the course of nitrofurantoin prescribed your primary care physician.

## 2024-02-13 NOTE — ED NOTES
Patient is asking if she can go home and states she does not understand why she is being kept here.  Explained to her that her oxygen dropped in the waiting room yesterday after her procedure and she became hypotensive.  Patient wants to leave and states she \"feels fine\".  Dr. Diaz informed via perfect serve

## 2024-02-13 NOTE — ED NOTES
Dr. Diaz at bedside to speak with patient.  He informed this writer that if patient can perform ambulatory pulse ox without oxygen saturation dropping, she may go.

## 2024-02-13 NOTE — ED PROVIDER NOTES
[facility] EMERGENCY DEPT  EMERGENCY DEPARTMENT HISTORY AND PHYSICAL EXAM      Date: 2/12/2024  Patient Name: Cristian Chandra  MRN: [id number]  YOB: 1950  Date of evaluation: 2/12/2024  Provider: Lupe Leyva MD   Note Started: 9:46 PM EST 2/12/24    HISTORY OF PRESENT ILLNESS     Chief Complaint   Patient presents with    Shortness of Breath       History Provided By: Patient    HPI: Cristian Chandra is a  73-year-old female with history of ACS, COPD, sick sinus syndrome, tobacco abuse here with weakness.  Patient was having herPacemaker exchanged earlier today with Dr Zamora.  States when she was discharged but the hospital was under lockdown and while waiting she started feeling weak, lightheaded and SOB.  States she has not eaten anything all day.  Patient has been receiving Macrobid for a uti, concerned because she missed today's dose.  Was having some suprapubic discomfort but that has improved since being on antibiotics.  Patient has a history of COPD.  Has inhalers at home.    PAST MEDICAL HISTORY   Past Medical History:  Past Medical History:   Diagnosis Date    Cataracts, bilateral     had surgery    Chronic anemia     Chronic obstructive pulmonary disease (HCC)     GERD (gastroesophageal reflux disease)     High cholesterol     Hypertension     Hypertension     Lung nodules     gets CT scan every year    Obesity     AMRS (obstructive sleep apnea)     doesn't wear CPAP    Pacemaker     Prediabetes     Sick sinus syndrome (HCC)     Uterine prolapse        Past Surgical History:  Past Surgical History:   Procedure Laterality Date    ABDOMINAL AORTIC ANEURYSM REPAIR      BASAL CELL CARCINOMA EXCISION      nose, ear    CHOLECYSTECTOMY      COLONOSCOPY      CYST REMOVAL      INSERTABLE CARDIAC MONITOR      PACEMAKER      PACEMAKER PLACEMENT      TUMOR REMOVAL      Right neck, benign results    VAGINAL PROLAPSE REPAIR         Family History:  Family History   Problem Relation Age of Onset    Leukemia  Caps  Generic drug: Biotin     CITRACAL +D3 PO     CoQ10 100 MG Caps     Dulera 100-5 MCG/ACT inhaler  Generic drug: mometasone-formoterol     ergocalciferol 1.25 MG (51534 UT) capsule  Commonly known as: ERGOCALCIFEROL     ezetimibe 10 MG tablet  Commonly known as: ZETIA     loratadine 10 MG tablet  Commonly known as: CLARITIN     MAGNESIUM GLYCINATE PO     nitroGLYCERIN 0.4 MG SL tablet  Commonly known as: NITROSTAT     olmesartan 5 MG tablet  Commonly known as: BENICAR     pantoprazole 40 MG tablet  Commonly known as: PROTONIX     tiotropium 18 MCG inhalation capsule  Commonly known as: SPIRIVA     Ventolin  (90 Base) MCG/ACT inhaler  Generic drug: albuterol sulfate HFA     vitamin C 100 MG tablet     zinc gluconate 50 MG tablet                DISCONTINUED MEDICATIONS:  Current Discharge Medication List          I am the Primary Clinician of Record: Lupe Leyva MD (electronically signed)    (Please note that parts of this dictation were completed with voice recognition software. Quite often unanticipated grammatical, syntax, homophones, and other interpretive errors are inadvertently transcribed by the computer software. Please disregards these errors. Please excuse any errors that have escaped final proofreading.)     Lupe Leyva MD  02/13/24 0006

## 2024-02-13 NOTE — ED NOTES
Assumed care from evening nurse, patient sitting up in chair denies pain at this point in time. Patient is alert and oriented x 4, call light within reach.

## 2025-05-27 ENCOUNTER — APPOINTMENT (OUTPATIENT)
Facility: HOSPITAL | Age: 75
DRG: 322 | End: 2025-05-27
Attending: INTERNAL MEDICINE
Payer: MEDICARE

## 2025-05-27 ENCOUNTER — HOSPITAL ENCOUNTER (INPATIENT)
Facility: HOSPITAL | Age: 75
LOS: 2 days | Discharge: HOME | DRG: 322 | End: 2025-05-29
Admitting: STUDENT IN AN ORGANIZED HEALTH CARE EDUCATION/TRAINING PROGRAM
Payer: MEDICARE

## 2025-05-27 ENCOUNTER — APPOINTMENT (OUTPATIENT)
Facility: HOSPITAL | Age: 75
DRG: 322 | End: 2025-05-27
Payer: MEDICARE

## 2025-05-27 DIAGNOSIS — I21.3 ST ELEVATION MYOCARDIAL INFARCTION (STEMI), UNSPECIFIED ARTERY (HCC): ICD-10-CM

## 2025-05-27 DIAGNOSIS — I21.3 STEMI (ST ELEVATION MYOCARDIAL INFARCTION) (HCC): Primary | ICD-10-CM

## 2025-05-27 LAB
ACT BLD: 204 SEC (ref 74–125)
ALBUMIN SERPL-MCNC: 3 G/DL (ref 3.5–5)
ALBUMIN/GLOB SERPL: 1.1 (ref 1.1–2.2)
ALP SERPL-CCNC: 77 U/L (ref 45–117)
ALT SERPL-CCNC: 18 U/L (ref 12–78)
ANION GAP SERPL CALC-SCNC: 7 MMOL/L (ref 2–12)
AST SERPL W P-5'-P-CCNC: 17 U/L (ref 15–37)
BASOPHILS # BLD: 0.03 K/UL (ref 0–0.1)
BASOPHILS NFR BLD: 0.3 % (ref 0–1)
BILIRUB SERPL-MCNC: 0.2 MG/DL (ref 0.2–1)
BUN SERPL-MCNC: 10 MG/DL (ref 6–20)
BUN/CREAT SERPL: 15 (ref 12–20)
CA-I BLD-MCNC: 8.2 MG/DL (ref 8.5–10.1)
CHLORIDE SERPL-SCNC: 113 MMOL/L (ref 97–108)
CO2 SERPL-SCNC: 24 MMOL/L (ref 21–32)
CREAT SERPL-MCNC: 0.68 MG/DL (ref 0.55–1.02)
DIFFERENTIAL METHOD BLD: ABNORMAL
ECHO BSA: 1.87 M2
EKG ATRIAL RATE: 76 BPM
EKG ATRIAL RATE: 84 BPM
EKG DIAGNOSIS: NORMAL
EKG DIAGNOSIS: NORMAL
EKG P AXIS: 53 DEGREES
EKG P AXIS: 75 DEGREES
EKG P-R INTERVAL: 218 MS
EKG P-R INTERVAL: 318 MS
EKG Q-T INTERVAL: 420 MS
EKG Q-T INTERVAL: 456 MS
EKG QRS DURATION: 140 MS
EKG QRS DURATION: 90 MS
EKG QTC CALCULATION (BAZETT): 472 MS
EKG QTC CALCULATION (BAZETT): 538 MS
EKG R AXIS: -75 DEGREES
EKG R AXIS: 36 DEGREES
EKG T AXIS: 100 DEGREES
EKG T AXIS: 106 DEGREES
EKG VENTRICULAR RATE: 76 BPM
EKG VENTRICULAR RATE: 84 BPM
EOSINOPHIL # BLD: 0.17 K/UL (ref 0–0.4)
EOSINOPHIL NFR BLD: 1.9 % (ref 0–7)
ERYTHROCYTE [DISTWIDTH] IN BLOOD BY AUTOMATED COUNT: 18.1 % (ref 11.5–14.5)
GLOBULIN SER CALC-MCNC: 2.7 G/DL (ref 2–4)
GLUCOSE SERPL-MCNC: 114 MG/DL (ref 65–100)
HCT VFR BLD AUTO: 31.2 % (ref 35–47)
HGB BLD-MCNC: 9 G/DL (ref 11.5–16)
IMM GRANULOCYTES # BLD AUTO: 0.05 K/UL (ref 0–0.04)
IMM GRANULOCYTES NFR BLD AUTO: 0.6 % (ref 0–0.5)
LYMPHOCYTES # BLD: 2.19 K/UL (ref 0.8–3.5)
LYMPHOCYTES NFR BLD: 24.3 % (ref 12–49)
MCH RBC QN AUTO: 24.3 PG (ref 26–34)
MCHC RBC AUTO-ENTMCNC: 28.8 G/DL (ref 30–36.5)
MCV RBC AUTO: 84.3 FL (ref 80–99)
MONOCYTES # BLD: 0.64 K/UL (ref 0–1)
MONOCYTES NFR BLD: 7.1 % (ref 5–13)
MRSA DNA SPEC QL NAA+PROBE: NOT DETECTED
NEUTS SEG # BLD: 5.92 K/UL (ref 1.8–8)
NEUTS SEG NFR BLD: 65.8 % (ref 32–75)
NRBC # BLD: 0 K/UL (ref 0–0.01)
NRBC BLD-RTO: 0 PER 100 WBC
PERFORMED BY:: ABNORMAL
PLATELET # BLD AUTO: 203 K/UL (ref 150–400)
PMV BLD AUTO: 10.2 FL (ref 8.9–12.9)
POTASSIUM SERPL-SCNC: 3.8 MMOL/L (ref 3.5–5.1)
PROT SERPL-MCNC: 5.7 G/DL (ref 6.4–8.2)
RBC # BLD AUTO: 3.7 M/UL (ref 3.8–5.2)
RBC MORPH BLD: ABNORMAL
SODIUM SERPL-SCNC: 144 MMOL/L (ref 136–145)
TROPONIN I SERPL HS-MCNC: 10 NG/L (ref 0–51)
WBC # BLD AUTO: 9 K/UL (ref 3.6–11)

## 2025-05-27 PROCEDURE — 99152 MOD SED SAME PHYS/QHP 5/>YRS: CPT | Performed by: INTERNAL MEDICINE

## 2025-05-27 PROCEDURE — 2500000003 HC RX 250 WO HCPCS: Performed by: INTERNAL MEDICINE

## 2025-05-27 PROCEDURE — 6360000004 HC RX CONTRAST MEDICATION

## 2025-05-27 PROCEDURE — 027034Z DILATION OF CORONARY ARTERY, ONE ARTERY WITH DRUG-ELUTING INTRALUMINAL DEVICE, PERCUTANEOUS APPROACH: ICD-10-PCS | Performed by: INTERNAL MEDICINE

## 2025-05-27 PROCEDURE — 6370000000 HC RX 637 (ALT 250 FOR IP): Performed by: INTERNAL MEDICINE

## 2025-05-27 PROCEDURE — 2500000003 HC RX 250 WO HCPCS: Performed by: STUDENT IN AN ORGANIZED HEALTH CARE EDUCATION/TRAINING PROGRAM

## 2025-05-27 PROCEDURE — 4A023N7 MEASUREMENT OF CARDIAC SAMPLING AND PRESSURE, LEFT HEART, PERCUTANEOUS APPROACH: ICD-10-PCS | Performed by: INTERNAL MEDICINE

## 2025-05-27 PROCEDURE — B2111ZZ FLUOROSCOPY OF MULTIPLE CORONARY ARTERIES USING LOW OSMOLAR CONTRAST: ICD-10-PCS | Performed by: INTERNAL MEDICINE

## 2025-05-27 PROCEDURE — C1894 INTRO/SHEATH, NON-LASER: HCPCS | Performed by: INTERNAL MEDICINE

## 2025-05-27 PROCEDURE — 6360000002 HC RX W HCPCS

## 2025-05-27 PROCEDURE — C9606 PERC D-E COR REVASC W AMI S: HCPCS | Performed by: INTERNAL MEDICINE

## 2025-05-27 PROCEDURE — 87641 MR-STAPH DNA AMP PROBE: CPT

## 2025-05-27 PROCEDURE — 80053 COMPREHEN METABOLIC PANEL: CPT

## 2025-05-27 PROCEDURE — 2709999900 HC NON-CHARGEABLE SUPPLY: Performed by: INTERNAL MEDICINE

## 2025-05-27 PROCEDURE — 93005 ELECTROCARDIOGRAM TRACING: CPT | Performed by: INTERNAL MEDICINE

## 2025-05-27 PROCEDURE — C1874 STENT, COATED/COV W/DEL SYS: HCPCS | Performed by: INTERNAL MEDICINE

## 2025-05-27 PROCEDURE — 85347 COAGULATION TIME ACTIVATED: CPT

## 2025-05-27 PROCEDURE — B2151ZZ FLUOROSCOPY OF LEFT HEART USING LOW OSMOLAR CONTRAST: ICD-10-PCS | Performed by: INTERNAL MEDICINE

## 2025-05-27 PROCEDURE — 93005 ELECTROCARDIOGRAM TRACING: CPT

## 2025-05-27 PROCEDURE — 96374 THER/PROPH/DIAG INJ IV PUSH: CPT

## 2025-05-27 PROCEDURE — 85025 COMPLETE CBC W/AUTO DIFF WBC: CPT

## 2025-05-27 PROCEDURE — 93458 L HRT ARTERY/VENTRICLE ANGIO: CPT | Performed by: INTERNAL MEDICINE

## 2025-05-27 PROCEDURE — C1769 GUIDE WIRE: HCPCS | Performed by: INTERNAL MEDICINE

## 2025-05-27 PROCEDURE — C8929 TTE W OR WO FOL WCON,DOPPLER: HCPCS

## 2025-05-27 PROCEDURE — 2580000003 HC RX 258: Performed by: INTERNAL MEDICINE

## 2025-05-27 PROCEDURE — 99285 EMERGENCY DEPT VISIT HI MDM: CPT

## 2025-05-27 PROCEDURE — 84484 ASSAY OF TROPONIN QUANT: CPT

## 2025-05-27 PROCEDURE — 6360000002 HC RX W HCPCS: Performed by: INTERNAL MEDICINE

## 2025-05-27 PROCEDURE — C1760 CLOSURE DEV, VASC: HCPCS | Performed by: INTERNAL MEDICINE

## 2025-05-27 PROCEDURE — 99153 MOD SED SAME PHYS/QHP EA: CPT | Performed by: INTERNAL MEDICINE

## 2025-05-27 PROCEDURE — 2000000000 HC ICU R&B

## 2025-05-27 PROCEDURE — 94761 N-INVAS EAR/PLS OXIMETRY MLT: CPT

## 2025-05-27 PROCEDURE — 6370000000 HC RX 637 (ALT 250 FOR IP)

## 2025-05-27 PROCEDURE — 6360000004 HC RX CONTRAST MEDICATION: Performed by: INTERNAL MEDICINE

## 2025-05-27 PROCEDURE — C1887 CATHETER, GUIDING: HCPCS | Performed by: INTERNAL MEDICINE

## 2025-05-27 PROCEDURE — C1725 CATH, TRANSLUMIN NON-LASER: HCPCS | Performed by: INTERNAL MEDICINE

## 2025-05-27 DEVICE — STENT RONYX35015UX RESOLUTE ONYX 3.50X15
Type: IMPLANTABLE DEVICE | Site: CORONARY - RIGHT CORONARY ARTERY | Status: FUNCTIONAL
Brand: RESOLUTE ONYX™

## 2025-05-27 DEVICE — ANGIO-SEAL VIP VASCULAR CLOSURE DEVICE
Type: IMPLANTABLE DEVICE | Site: GROIN | Status: FUNCTIONAL
Brand: ANGIO-SEAL

## 2025-05-27 RX ORDER — FENTANYL CITRATE 50 UG/ML
INJECTION, SOLUTION INTRAMUSCULAR; INTRAVENOUS PRN
Status: DISCONTINUED | OUTPATIENT
Start: 2025-05-27 | End: 2025-05-27 | Stop reason: HOSPADM

## 2025-05-27 RX ORDER — POTASSIUM CHLORIDE 29.8 MG/ML
20 INJECTION INTRAVENOUS PRN
Status: DISCONTINUED | OUTPATIENT
Start: 2025-05-27 | End: 2025-05-29 | Stop reason: HOSPADM

## 2025-05-27 RX ORDER — HEPARIN SODIUM 200 [USP'U]/100ML
INJECTION, SOLUTION INTRAVENOUS CONTINUOUS PRN
Status: COMPLETED | OUTPATIENT
Start: 2025-05-27 | End: 2025-05-27

## 2025-05-27 RX ORDER — MAGNESIUM SULFATE IN WATER 40 MG/ML
2000 INJECTION, SOLUTION INTRAVENOUS PRN
Status: DISCONTINUED | OUTPATIENT
Start: 2025-05-27 | End: 2025-05-29 | Stop reason: HOSPADM

## 2025-05-27 RX ORDER — ROSUVASTATIN CALCIUM 20 MG/1
20 TABLET, COATED ORAL NIGHTLY
Status: DISCONTINUED | OUTPATIENT
Start: 2025-05-27 | End: 2025-05-29 | Stop reason: HOSPADM

## 2025-05-27 RX ORDER — TICAGRELOR 90 MG/1
90 TABLET, FILM COATED ORAL 2 TIMES DAILY
Status: DISCONTINUED | OUTPATIENT
Start: 2025-05-27 | End: 2025-05-29 | Stop reason: HOSPADM

## 2025-05-27 RX ORDER — SODIUM CHLORIDE 0.9 % (FLUSH) 0.9 %
5-40 SYRINGE (ML) INJECTION EVERY 12 HOURS SCHEDULED
Status: DISCONTINUED | OUTPATIENT
Start: 2025-05-27 | End: 2025-05-29 | Stop reason: HOSPADM

## 2025-05-27 RX ORDER — SODIUM CHLORIDE 9 MG/ML
INJECTION, SOLUTION INTRAVENOUS PRN
Status: DISCONTINUED | OUTPATIENT
Start: 2025-05-27 | End: 2025-05-29 | Stop reason: HOSPADM

## 2025-05-27 RX ORDER — ACETAMINOPHEN 325 MG/1
650 TABLET ORAL EVERY 4 HOURS PRN
Status: DISCONTINUED | OUTPATIENT
Start: 2025-05-27 | End: 2025-05-27 | Stop reason: SDUPTHER

## 2025-05-27 RX ORDER — ASPIRIN 81 MG/1
81 TABLET, CHEWABLE ORAL DAILY
Status: DISCONTINUED | OUTPATIENT
Start: 2025-05-28 | End: 2025-05-29 | Stop reason: HOSPADM

## 2025-05-27 RX ORDER — IOPAMIDOL 755 MG/ML
INJECTION, SOLUTION INTRAVASCULAR PRN
Status: DISCONTINUED | OUTPATIENT
Start: 2025-05-27 | End: 2025-05-27 | Stop reason: HOSPADM

## 2025-05-27 RX ORDER — SODIUM CHLORIDE 0.9 % (FLUSH) 0.9 %
5-40 SYRINGE (ML) INJECTION PRN
Status: DISCONTINUED | OUTPATIENT
Start: 2025-05-27 | End: 2025-05-29 | Stop reason: HOSPADM

## 2025-05-27 RX ORDER — ENOXAPARIN SODIUM 100 MG/ML
40 INJECTION SUBCUTANEOUS DAILY
Status: DISCONTINUED | OUTPATIENT
Start: 2025-05-27 | End: 2025-05-29 | Stop reason: HOSPADM

## 2025-05-27 RX ORDER — POLYETHYLENE GLYCOL 3350 17 G/17G
17 POWDER, FOR SOLUTION ORAL DAILY PRN
Status: DISCONTINUED | OUTPATIENT
Start: 2025-05-27 | End: 2025-05-29 | Stop reason: HOSPADM

## 2025-05-27 RX ORDER — LIDOCAINE HYDROCHLORIDE 10 MG/ML
INJECTION, SOLUTION INFILTRATION; PERINEURAL PRN
Status: DISCONTINUED | OUTPATIENT
Start: 2025-05-27 | End: 2025-05-27 | Stop reason: HOSPADM

## 2025-05-27 RX ORDER — ONDANSETRON 2 MG/ML
4 INJECTION INTRAMUSCULAR; INTRAVENOUS EVERY 6 HOURS PRN
Status: DISCONTINUED | OUTPATIENT
Start: 2025-05-27 | End: 2025-05-29 | Stop reason: HOSPADM

## 2025-05-27 RX ORDER — ACETAMINOPHEN 650 MG/1
650 SUPPOSITORY RECTAL EVERY 6 HOURS PRN
Status: DISCONTINUED | OUTPATIENT
Start: 2025-05-27 | End: 2025-05-29 | Stop reason: HOSPADM

## 2025-05-27 RX ORDER — MIDAZOLAM HYDROCHLORIDE 2 MG/2ML
INJECTION, SOLUTION INTRAMUSCULAR; INTRAVENOUS PRN
Status: DISCONTINUED | OUTPATIENT
Start: 2025-05-27 | End: 2025-05-27 | Stop reason: HOSPADM

## 2025-05-27 RX ORDER — LOSARTAN POTASSIUM 25 MG/1
25 TABLET ORAL DAILY
Status: DISCONTINUED | OUTPATIENT
Start: 2025-05-27 | End: 2025-05-29 | Stop reason: HOSPADM

## 2025-05-27 RX ORDER — ACETAMINOPHEN 325 MG/1
650 TABLET ORAL EVERY 6 HOURS PRN
Status: DISCONTINUED | OUTPATIENT
Start: 2025-05-27 | End: 2025-05-29 | Stop reason: HOSPADM

## 2025-05-27 RX ORDER — DIPHENHYDRAMINE HYDROCHLORIDE 50 MG/ML
INJECTION, SOLUTION INTRAMUSCULAR; INTRAVENOUS PRN
Status: DISCONTINUED | OUTPATIENT
Start: 2025-05-27 | End: 2025-05-27 | Stop reason: HOSPADM

## 2025-05-27 RX ORDER — TICAGRELOR 90 MG/1
TABLET, FILM COATED ORAL DAILY PRN
Status: COMPLETED | OUTPATIENT
Start: 2025-05-27 | End: 2025-05-27

## 2025-05-27 RX ORDER — CARVEDILOL 3.12 MG/1
6.25 TABLET ORAL 2 TIMES DAILY WITH MEALS
Status: DISCONTINUED | OUTPATIENT
Start: 2025-05-27 | End: 2025-05-29 | Stop reason: HOSPADM

## 2025-05-27 RX ORDER — HEPARIN SODIUM 1000 [USP'U]/ML
INJECTION, SOLUTION INTRAVENOUS; SUBCUTANEOUS DAILY PRN
Status: COMPLETED | OUTPATIENT
Start: 2025-05-27 | End: 2025-05-27

## 2025-05-27 RX ORDER — 0.9 % SODIUM CHLORIDE 0.9 %
INTRAVENOUS SOLUTION INTRAVENOUS CONTINUOUS PRN
Status: COMPLETED | OUTPATIENT
Start: 2025-05-27 | End: 2025-05-27

## 2025-05-27 RX ORDER — POTASSIUM CHLORIDE 7.45 MG/ML
10 INJECTION INTRAVENOUS PRN
Status: DISCONTINUED | OUTPATIENT
Start: 2025-05-27 | End: 2025-05-29 | Stop reason: HOSPADM

## 2025-05-27 RX ORDER — HEPARIN SODIUM 1000 [USP'U]/ML
INJECTION, SOLUTION INTRAVENOUS; SUBCUTANEOUS PRN
Status: DISCONTINUED | OUTPATIENT
Start: 2025-05-27 | End: 2025-05-27 | Stop reason: HOSPADM

## 2025-05-27 RX ORDER — ONDANSETRON 4 MG/1
4 TABLET, ORALLY DISINTEGRATING ORAL EVERY 8 HOURS PRN
Status: DISCONTINUED | OUTPATIENT
Start: 2025-05-27 | End: 2025-05-29 | Stop reason: HOSPADM

## 2025-05-27 RX ADMIN — ROSUVASTATIN CALCIUM 20 MG: 20 TABLET, FILM COATED ORAL at 20:28

## 2025-05-27 RX ADMIN — CARVEDILOL 6.25 MG: 3.12 TABLET, FILM COATED ORAL at 18:19

## 2025-05-27 RX ADMIN — TICAGRELOR 180 MG: 90 TABLET ORAL at 14:42

## 2025-05-27 RX ADMIN — HEPARIN SODIUM 6000 UNITS: 1000 INJECTION, SOLUTION INTRAVENOUS; SUBCUTANEOUS at 14:37

## 2025-05-27 RX ADMIN — LOSARTAN POTASSIUM 25 MG: 25 TABLET, FILM COATED ORAL at 18:19

## 2025-05-27 RX ADMIN — SODIUM CHLORIDE, PRESERVATIVE FREE 10 ML: 5 INJECTION INTRAVENOUS at 20:23

## 2025-05-27 RX ADMIN — TICAGRELOR 90 MG: 90 TABLET ORAL at 21:48

## 2025-05-27 RX ADMIN — SULFUR HEXAFLUORIDE 5 ML: 60.7; .19; .19 INJECTION, POWDER, LYOPHILIZED, FOR SUSPENSION INTRAVENOUS; INTRAVESICAL at 19:40

## 2025-05-27 ASSESSMENT — PAIN SCALES - GENERAL
PAINLEVEL_OUTOF10: 10
PAINLEVEL_OUTOF10: 0

## 2025-05-27 ASSESSMENT — LIFESTYLE VARIABLES
HOW OFTEN DO YOU HAVE A DRINK CONTAINING ALCOHOL: NEVER
HOW MANY STANDARD DRINKS CONTAINING ALCOHOL DO YOU HAVE ON A TYPICAL DAY: PATIENT DOES NOT DRINK

## 2025-05-27 ASSESSMENT — PAIN DESCRIPTION - LOCATION: LOCATION: CHEST

## 2025-05-27 NOTE — H&P
CRITICAL CARE ADMISSION NOTE    Name: Cristian Chandra   : 1950   MRN: [id number]   Date: 2025      Diagnoses/problem list:   STEMI   HTN  Pre-diabetes  Smoking  AAA s/p repair  PAD s/p repair  Tachy-zeinab syndrome s/p PPM    HPI   74-year-old female with history of PAD, HTN, AAA s/p repair, CAD (1 stent 7 years ago), MARS (non-compliant with C-pap), PPM came to the hospital today for acute chest pain. Associated left arm pain. Symptoms ongoing for several months. Took nitroglycerine which caused hypotension and she passed out. In ED, EKG with inferior wall ST-elevation. Underwent cardiac cath & had 1 RCA stent was placed. Transferred to ICU afterwards. Daughters at bedside.     Patient smokes 1 PPD. Denies alcohol or other drugs. States being compliant with home medications.     ROS negative except as otherwise documented.    Assessment and plan:     1. STEMI   - S/p LHC today and 1 RCA stent was placed  - Aspirin and brilinta + coreg  - Pending TTE  - Check A1c, lipid profile   - Right groin bleeding precautions     2. Smoking  - Discussed about smoking cessation  - Not interested      3. MARS  - Does not like using nocturnal C-pap    SYSTEMS     Neuro - Awake  Pulm - No issues  CVS - No issues  GI - PO diet  Renal - No issues    ICU DAILY CHECKLIST     Code Status: Full code  DVT Prophylaxis: SCDs/Lovenox sq  GI Prophylaxis: Not needed  Feeding: Po diet  ABCDEF Bundle/Checklist Completed: Yes  Disposition: Stay in ICU for 24 hours  Social: Discussed with patient and daughters    Past Medical History:      has a past medical history of Cataracts, bilateral, Chronic anemia, Chronic obstructive pulmonary disease (HCC), GERD (gastroesophageal reflux disease), High cholesterol, Hypertension, Hypertension, Lung nodules, Obesity, MARS (obstructive sleep apnea), Pacemaker, Prediabetes, Sick sinus syndrome (HCC), and Uterine prolapse.    Past Surgical History:      has a past surgical history that includes

## 2025-05-27 NOTE — ED PROVIDER NOTES
[facility] EMERGENCY DEPT  EMERGENCY DEPARTMENT HISTORY AND PHYSICAL EXAM      Date of evaluation: 5/27/2025  Patient Name: Cristian Chandra  Birthdate 1950  MRN: [id number]  ED Provider: Mia Snyder MD   Note Started: 2:45 PM EDT 5/27/25    HISTORY OF PRESENT ILLNESS     Chief Complaint   Patient presents with    STEMI       History Provided By: Patient , EMS    HPI: Cristian Chandra is a 74 y.o. female with pmhx as reviewed below including prior MI s/p PCI with stents 7 yrs ago who presents with chest pain. EMS states ECG concerning for STEMI. Pt took her own SL nitro at home, subsequently became dizzy and lightheaded. Still reporting chest pain. ASA given by EMS.     PAST MEDICAL HISTORY   Past Medical History:  Past Medical History:   Diagnosis Date    Cataracts, bilateral     had surgery    Chronic anemia     Chronic obstructive pulmonary disease (HCC)     GERD (gastroesophageal reflux disease)     High cholesterol     Hypertension     Hypertension     Lung nodules     gets CT scan every year    Obesity     MARS (obstructive sleep apnea)     doesn't wear CPAP    Pacemaker     Prediabetes     Sick sinus syndrome (HCC)     Uterine prolapse        Past Surgical History:  Past Surgical History:   Procedure Laterality Date    ABDOMINAL AORTIC ANEURYSM REPAIR      BASAL CELL CARCINOMA EXCISION      nose, ear    CHOLECYSTECTOMY      COLONOSCOPY      CYST REMOVAL      EP DEVICE PROCEDURE N/A 2/12/2024    Remove & replace PPM gen dual lead performed by Leobardo Zamora MD at [facility] ELECTROPHYSIOLOGY    INSERTABLE CARDIAC MONITOR      PACEMAKER      PACEMAKER PLACEMENT      TUMOR REMOVAL      Right neck, benign results    VAGINAL PROLAPSE REPAIR         Family History:  Family History   Problem Relation Age of Onset    Leukemia Mother     Heart Failure Father     Coronary Art Dis Brother         Brother #2 neck cancer       Social History:  Social History     Tobacco Use    Smoking status: Every Day     Current

## 2025-05-27 NOTE — ED TRIAGE NOTES
Per EMS, Pt had chest pain this morning that radiates to her left arm. She took one SL nitro and began to feel dizzy and became hypotensive. Given 600ml of LR in route

## 2025-05-27 NOTE — CONSULTS
Consult    NAME: Cristian Chandra   :  1950   MRN:  [id number]     Date/Time:  2025 4:02 PM    Patient PCP: Trav Thompson MD  ________________________________________________________________________      Subjective:   CHIEF COMPLAINT: Chest pain and STEMI alert was announced and I responded.    HISTORY OF PRESENT ILLNESS:   Patient stated that she started having pain in her arm on the left side and shoulder since morning and then it became severe pain in her chest and she became short of breath and nauseated.  She is having similar pain but much milder for last couple months or longer and that it gets better by itself but today it got worse.  She did feel nauseated but did not throw up.  She has a history of abdominal aortic aneurysm repair and peripheral arterial disease and she is a smoker.  Her EKG was very impressive for acute inferior wall STEMI.           Past Medical History:   Diagnosis Date    Cataracts, bilateral     had surgery    Chronic anemia     Chronic obstructive pulmonary disease (HCC)     GERD (gastroesophageal reflux disease)     High cholesterol     Hypertension     Hypertension     Lung nodules     gets CT scan every year    Obesity     MARS (obstructive sleep apnea)     doesn't wear CPAP    Pacemaker     Prediabetes     Sick sinus syndrome (HCC)     Uterine prolapse       Past Surgical History:   Procedure Laterality Date    ABDOMINAL AORTIC ANEURYSM REPAIR      BASAL CELL CARCINOMA EXCISION      nose, ear    CHOLECYSTECTOMY      COLONOSCOPY      CYST REMOVAL      EP DEVICE PROCEDURE N/A 2024    Remove & replace PPM gen dual lead performed by Leobardo Zamora MD at [facility] ELECTROPHYSIOLOGY    INSERTABLE CARDIAC MONITOR      PACEMAKER      PACEMAKER PLACEMENT      TUMOR REMOVAL      Right neck, benign results    VAGINAL PROLAPSE REPAIR       Allergies   Allergen Reactions    Statins Other (See Comments)     Muscle cramps        Meds:  See below  Social History

## 2025-05-28 LAB
ANION GAP SERPL CALC-SCNC: 7 MMOL/L (ref 2–12)
BUN SERPL-MCNC: 10 MG/DL (ref 6–20)
BUN/CREAT SERPL: 19 (ref 12–20)
CA-I BLD-MCNC: 8.4 MG/DL (ref 8.5–10.1)
CHLORIDE SERPL-SCNC: 114 MMOL/L (ref 97–108)
CHOLEST SERPL-MCNC: 102 MG/DL
CO2 SERPL-SCNC: 22 MMOL/L (ref 21–32)
CREAT SERPL-MCNC: 0.53 MG/DL (ref 0.55–1.02)
ECHO AO ASC DIAM: 3.2 CM
ECHO AO ASCENDING AORTA INDEX: 1.72 CM/M2
ECHO AO ROOT DIAM: 3.6 CM
ECHO AO ROOT INDEX: 1.94 CM/M2
ECHO AV AREA PEAK VELOCITY: 2 CM2
ECHO AV AREA VTI: 2 CM2
ECHO AV AREA/BSA PEAK VELOCITY: 1.1 CM2/M2
ECHO AV AREA/BSA VTI: 1.1 CM2/M2
ECHO AV MEAN GRADIENT: 3 MMHG
ECHO AV MEAN VELOCITY: 0.8 M/S
ECHO AV PEAK GRADIENT: 6 MMHG
ECHO AV PEAK VELOCITY: 1.2 M/S
ECHO AV VELOCITY RATIO: 0.58
ECHO AV VTI: 23.7 CM
ECHO BSA: 1.87 M2
ECHO EST RA PRESSURE: 8 MMHG
ECHO IVC EXP: 2.4 CM
ECHO LA AREA 4C: 20.5 CM2
ECHO LA DIAMETER INDEX: 2.1 CM/M2
ECHO LA DIAMETER: 3.9 CM
ECHO LA MAJOR AXIS: 5.2 CM
ECHO LA TO AORTIC ROOT RATIO: 1.08
ECHO LA VOL MOD A4C: 63 ML (ref 22–52)
ECHO LA VOLUME INDEX MOD A4C: 34 ML/M2 (ref 16–34)
ECHO LV E' LATERAL VELOCITY: 9.27 CM/S
ECHO LV E' SEPTAL VELOCITY: 5.55 CM/S
ECHO LV EDV A4C: 108 ML
ECHO LV EDV INDEX A4C: 58 ML/M2
ECHO LV EF PHYSICIAN: 50 %
ECHO LV EJECTION FRACTION A4C: 66 %
ECHO LV EJECTION FRACTION BIPLANE: 66 % (ref 55–100)
ECHO LV ESV A4C: 37 ML
ECHO LV ESV INDEX A4C: 20 ML/M2
ECHO LV FRACTIONAL SHORTENING: 26 % (ref 28–44)
ECHO LV INTERNAL DIMENSION DIASTOLE INDEX: 2.85 CM/M2
ECHO LV INTERNAL DIMENSION DIASTOLIC: 5.3 CM (ref 3.9–5.3)
ECHO LV INTERNAL DIMENSION SYSTOLIC INDEX: 2.1 CM/M2
ECHO LV INTERNAL DIMENSION SYSTOLIC: 3.9 CM
ECHO LV IVSD: 1 CM (ref 0.6–0.9)
ECHO LV MASS 2D: 174.5 G (ref 67–162)
ECHO LV MASS INDEX 2D: 93.8 G/M2 (ref 43–95)
ECHO LV POSTERIOR WALL DIASTOLIC: 0.8 CM (ref 0.6–0.9)
ECHO LV RELATIVE WALL THICKNESS RATIO: 0.3
ECHO LVOT AREA: 3.5 CM2
ECHO LVOT AV VTI INDEX: 0.59
ECHO LVOT DIAM: 2.1 CM
ECHO LVOT MEAN GRADIENT: 1 MMHG
ECHO LVOT PEAK GRADIENT: 2 MMHG
ECHO LVOT PEAK VELOCITY: 0.7 M/S
ECHO LVOT STROKE VOLUME INDEX: 26.1 ML/M2
ECHO LVOT SV: 48.5 ML
ECHO LVOT VTI: 14 CM
ECHO MV AREA VTI: 2.8 CM2
ECHO MV LVOT VTI INDEX: 1.26
ECHO MV MAX VELOCITY: 1.2 M/S
ECHO MV MEAN GRADIENT: 3 MMHG
ECHO MV MEAN VELOCITY: 0.8 M/S
ECHO MV PEAK GRADIENT: 6 MMHG
ECHO MV REGURGITANT PEAK GRADIENT: 31 MMHG
ECHO MV REGURGITANT PEAK VELOCITY: 2.8 M/S
ECHO MV VTI: 17.6 CM
ECHO PV ACCELERATION TIME (AT): 85 MS
ECHO PV MAX VELOCITY: 1.1 M/S
ECHO PV PEAK GRADIENT: 5 MMHG
ECHO RIGHT VENTRICULAR SYSTOLIC PRESSURE (RVSP): 19 MMHG
ECHO RV FREE WALL PEAK S': 11.6 CM/S
ECHO RV TAPSE: 1.7 CM (ref 1.7–?)
ECHO TV REGURGITANT MAX VELOCITY: 1.64 M/S
ECHO TV REGURGITANT PEAK GRADIENT: 11 MMHG
EKG ATRIAL RATE: 78 BPM
EKG ATRIAL RATE: 79 BPM
EKG DIAGNOSIS: NORMAL
EKG DIAGNOSIS: NORMAL
EKG P AXIS: 61 DEGREES
EKG P AXIS: 64 DEGREES
EKG P-R INTERVAL: 200 MS
EKG P-R INTERVAL: 218 MS
EKG Q-T INTERVAL: 410 MS
EKG Q-T INTERVAL: 416 MS
EKG QRS DURATION: 80 MS
EKG QRS DURATION: 86 MS
EKG QTC CALCULATION (BAZETT): 467 MS
EKG QTC CALCULATION (BAZETT): 477 MS
EKG R AXIS: 30 DEGREES
EKG R AXIS: 7 DEGREES
EKG T AXIS: -45 DEGREES
EKG T AXIS: 67 DEGREES
EKG VENTRICULAR RATE: 78 BPM
EKG VENTRICULAR RATE: 79 BPM
ERYTHROCYTE [DISTWIDTH] IN BLOOD BY AUTOMATED COUNT: 18.1 % (ref 11.5–14.5)
EST. AVERAGE GLUCOSE BLD GHB EST-MCNC: 114 MG/DL
GLUCOSE SERPL-MCNC: 106 MG/DL (ref 65–100)
HBA1C MFR BLD: 5.6 % (ref 4–5.6)
HCT VFR BLD AUTO: 29.6 % (ref 35–47)
HDLC SERPL-MCNC: 22 MG/DL
HDLC SERPL: 4.6 (ref 0–5)
HGB BLD-MCNC: 8.5 G/DL (ref 11.5–16)
LDLC SERPL CALC-MCNC: ABNORMAL MG/DL (ref 0–100)
LDLC SERPL DIRECT ASSAY-MCNC: 45 MG/DL (ref 0–100)
LIPID PANEL: ABNORMAL
MAGNESIUM SERPL-MCNC: 2.1 MG/DL (ref 1.6–2.4)
MCH RBC QN AUTO: 23.9 PG (ref 26–34)
MCHC RBC AUTO-ENTMCNC: 28.7 G/DL (ref 30–36.5)
MCV RBC AUTO: 83.4 FL (ref 80–99)
NRBC # BLD: 0 K/UL (ref 0–0.01)
NRBC BLD-RTO: 0 PER 100 WBC
PHOSPHATE SERPL-MCNC: 3.3 MG/DL (ref 2.6–4.7)
PLATELET # BLD AUTO: 197 K/UL (ref 150–400)
PMV BLD AUTO: 10.7 FL (ref 8.9–12.9)
POTASSIUM SERPL-SCNC: 4 MMOL/L (ref 3.5–5.1)
RBC # BLD AUTO: 3.55 M/UL (ref 3.8–5.2)
SODIUM SERPL-SCNC: 143 MMOL/L (ref 136–145)
TRIGL SERPL-MCNC: 451 MG/DL
TROPONIN I SERPL HS-MCNC: 3048 NG/L (ref 0–51)
TROPONIN I SERPL HS-MCNC: 7766 NG/L (ref 0–51)
VLDLC SERPL CALC-MCNC: ABNORMAL MG/DL
WBC # BLD AUTO: 11.1 K/UL (ref 3.6–11)

## 2025-05-28 PROCEDURE — 85027 COMPLETE CBC AUTOMATED: CPT

## 2025-05-28 PROCEDURE — 84100 ASSAY OF PHOSPHORUS: CPT

## 2025-05-28 PROCEDURE — 80048 BASIC METABOLIC PNL TOTAL CA: CPT

## 2025-05-28 PROCEDURE — 6360000002 HC RX W HCPCS: Performed by: STUDENT IN AN ORGANIZED HEALTH CARE EDUCATION/TRAINING PROGRAM

## 2025-05-28 PROCEDURE — 80061 LIPID PANEL: CPT

## 2025-05-28 PROCEDURE — 2500000003 HC RX 250 WO HCPCS: Performed by: STUDENT IN AN ORGANIZED HEALTH CARE EDUCATION/TRAINING PROGRAM

## 2025-05-28 PROCEDURE — 6370000000 HC RX 637 (ALT 250 FOR IP): Performed by: STUDENT IN AN ORGANIZED HEALTH CARE EDUCATION/TRAINING PROGRAM

## 2025-05-28 PROCEDURE — 36415 COLL VENOUS BLD VENIPUNCTURE: CPT

## 2025-05-28 PROCEDURE — 83036 HEMOGLOBIN GLYCOSYLATED A1C: CPT

## 2025-05-28 PROCEDURE — 94640 AIRWAY INHALATION TREATMENT: CPT

## 2025-05-28 PROCEDURE — 2500000003 HC RX 250 WO HCPCS: Performed by: INTERNAL MEDICINE

## 2025-05-28 PROCEDURE — 83721 ASSAY OF BLOOD LIPOPROTEIN: CPT

## 2025-05-28 PROCEDURE — 84484 ASSAY OF TROPONIN QUANT: CPT

## 2025-05-28 PROCEDURE — 94761 N-INVAS EAR/PLS OXIMETRY MLT: CPT

## 2025-05-28 PROCEDURE — 6370000000 HC RX 637 (ALT 250 FOR IP): Performed by: INTERNAL MEDICINE

## 2025-05-28 PROCEDURE — 93005 ELECTROCARDIOGRAM TRACING: CPT | Performed by: INTERNAL MEDICINE

## 2025-05-28 PROCEDURE — 2060000000 HC ICU INTERMEDIATE R&B

## 2025-05-28 PROCEDURE — 94762 N-INVAS EAR/PLS OXIMTRY CONT: CPT

## 2025-05-28 PROCEDURE — 83735 ASSAY OF MAGNESIUM: CPT

## 2025-05-28 RX ORDER — ALBUTEROL SULFATE 2.5 MG/.5ML
2.5 SOLUTION RESPIRATORY (INHALATION) EVERY 6 HOURS PRN
Status: DISCONTINUED | OUTPATIENT
Start: 2025-05-28 | End: 2025-05-29 | Stop reason: HOSPADM

## 2025-05-28 RX ORDER — NICOTINE 21 MG/24HR
1 PATCH, TRANSDERMAL 24 HOURS TRANSDERMAL DAILY
Status: DISCONTINUED | OUTPATIENT
Start: 2025-05-28 | End: 2025-05-29 | Stop reason: HOSPADM

## 2025-05-28 RX ORDER — IPRATROPIUM BROMIDE AND ALBUTEROL SULFATE 2.5; .5 MG/3ML; MG/3ML
1 SOLUTION RESPIRATORY (INHALATION) EVERY 4 HOURS PRN
Status: DISCONTINUED | OUTPATIENT
Start: 2025-05-28 | End: 2025-05-29 | Stop reason: HOSPADM

## 2025-05-28 RX ORDER — BUDESONIDE AND FORMOTEROL FUMARATE DIHYDRATE 160; 4.5 UG/1; UG/1
2 AEROSOL RESPIRATORY (INHALATION)
Status: DISCONTINUED | OUTPATIENT
Start: 2025-05-28 | End: 2025-05-29 | Stop reason: HOSPADM

## 2025-05-28 RX ADMIN — CARVEDILOL 6.25 MG: 3.12 TABLET, FILM COATED ORAL at 16:30

## 2025-05-28 RX ADMIN — TICAGRELOR 90 MG: 90 TABLET ORAL at 20:13

## 2025-05-28 RX ADMIN — CARVEDILOL 6.25 MG: 3.12 TABLET, FILM COATED ORAL at 09:00

## 2025-05-28 RX ADMIN — ROSUVASTATIN CALCIUM 20 MG: 20 TABLET, FILM COATED ORAL at 20:13

## 2025-05-28 RX ADMIN — TICAGRELOR 90 MG: 90 TABLET ORAL at 09:00

## 2025-05-28 RX ADMIN — LOSARTAN POTASSIUM 25 MG: 25 TABLET, FILM COATED ORAL at 09:00

## 2025-05-28 RX ADMIN — ASPIRIN 81 MG: 81 TABLET, CHEWABLE ORAL at 09:00

## 2025-05-28 RX ADMIN — SODIUM CHLORIDE, PRESERVATIVE FREE 10 ML: 5 INJECTION INTRAVENOUS at 20:14

## 2025-05-28 RX ADMIN — BUDESONIDE AND FORMOTEROL FUMARATE DIHYDRATE 2 PUFF: 160; 4.5 AEROSOL RESPIRATORY (INHALATION) at 20:51

## 2025-05-28 RX ADMIN — IPRATROPIUM BROMIDE AND ALBUTEROL SULFATE 1 DOSE: 2.5; .5 SOLUTION RESPIRATORY (INHALATION) at 16:05

## 2025-05-28 RX ADMIN — SODIUM CHLORIDE, PRESERVATIVE FREE 10 ML: 5 INJECTION INTRAVENOUS at 09:01

## 2025-05-28 RX ADMIN — ENOXAPARIN SODIUM 40 MG: 100 INJECTION SUBCUTANEOUS at 08:58

## 2025-05-28 ASSESSMENT — PAIN SCALES - GENERAL
PAINLEVEL_OUTOF10: 0

## 2025-05-28 NOTE — CONSULTS
Hospitalist Progress Note               Daily Progress Note: 5/28/2025      Hospital Day: 2     Chief complaint:   Chief Complaint   Patient presents with    STEMI        Brief HPI/ Hospital course to date:  74-year-old female with history of PAD, HTN, AAA s/p repair, CAD (1 stent 7 years ago), MARS (non-compliant with C-pap), PPM came to the hospital today for acute chest pain. Associated left arm pain. Symptoms ongoing for several months. Took nitroglycerine which caused hypotension and she passed out. In ED, EKG with inferior wall ST-elevation. Underwent cardiac cath & had 1 RCA stent was placed. Transferred to ICU afterwards. Daughters at bedside.     --------  Patient is seen today for follow-up.   He does not have chest pain at this time.  She is resting comfortably no acute distress.  Denies headache, chest pain/palpitations, shortness of breath, abdominal pain, urinary symptoms.  Does mention some diarrhea that is not worsening.  No abdominal pain.      All ROS negative otherwise mentioned above.      Assessment and Plan:    STEMI  - Patient presented with chest pain  - Troponin on admission was negative but EKG showed inferior wall STEMI   - Status post cath 5/27 with 100% occlusion of dominant RCA with stent placement  - Echo 5/27: EF 50 to 55%, severe hypokinesis, diastolic dysfunction  - Repeat troponin today 7766  - Continue aspirin 81, Coreg 6.25 twice daily, losartan 25, Brilinta 90    Leukocytosis, likely reactive  - Afebrile vitals are stable  - Will continue to monitor closely and do further workup if indicated    Anemia  - Hemoglobin stable continue to monitor closely    Diarrhea  - Does report 2 episodes of watery diarrhea that is nonbloody and not significantly foul odor swelling  -Patient will leave send nurse can take a look and if ongoing, will do additional testing.  Patient agreeable to this.  No abdominal pain or fevers.  Tolerating p.o.      Sick sinus syndrome status post

## 2025-05-28 NOTE — PLAN OF CARE
Problem: Discharge Planning  Goal: Discharge to home or other facility with appropriate resources  5/27/2025 2318 by Daisy Thomas RN  Outcome: Progressing  Flowsheets (Taken 5/27/2025 2000)  Discharge to home or other facility with appropriate resources: Identify barriers to discharge with patient and caregiver  5/27/2025 1835 by Breana Barba RN  Outcome: Progressing     Problem: ABCDS Injury Assessment  Goal: Absence of physical injury  5/27/2025 2318 by Daisy Thomas RN  Outcome: Progressing  5/27/2025 1835 by Breana Barba RN  Outcome: Progressing     Problem: Safety - Adult  Goal: Free from fall injury  5/27/2025 2318 by Daisy Thomas RN  Outcome: Progressing  5/27/2025 1835 by Breana Barba RN  Outcome: Progressing     Problem: Respiratory - Adult  Goal: Achieves optimal ventilation and oxygenation  Outcome: Progressing  Flowsheets (Taken 5/27/2025 2000)  Achieves optimal ventilation and oxygenation: Assess for changes in respiratory status     Problem: Cardiovascular - Adult  Goal: Maintains optimal cardiac output and hemodynamic stability  Outcome: Progressing  Flowsheets (Taken 5/27/2025 2000)  Maintains optimal cardiac output and hemodynamic stability: Monitor blood pressure and heart rate  Goal: Absence of cardiac dysrhythmias or at baseline  Outcome: Progressing  Flowsheets (Taken 5/27/2025 2000)  Absence of cardiac dysrhythmias or at baseline: Monitor cardiac rate and rhythm     Problem: Skin/Tissue Integrity - Adult  Goal: Skin integrity remains intact  Outcome: Progressing  Flowsheets (Taken 5/27/2025 2000)  Skin Integrity Remains Intact: Monitor for areas of redness and/or skin breakdown  Goal: Incisions, wounds, or drain sites healing without S/S of infection  Outcome: Progressing  Flowsheets (Taken 5/27/2025 2000)  Incisions, Wounds, or Drain Sites Healing Without Sign and Symptoms of Infection: ADMISSION and DAILY: Assess and document risk factors for pressure ulcer development  Goal:

## 2025-05-28 NOTE — PLAN OF CARE
Problem: Discharge Planning  Goal: Discharge to home or other facility with appropriate resources  5/28/2025 1219 by Breana Barba RN  Outcome: Progressing  5/27/2025 2318 by Daisy Thomas RN  Outcome: Progressing  Flowsheets (Taken 5/27/2025 2000)  Discharge to home or other facility with appropriate resources: Identify barriers to discharge with patient and caregiver     Problem: ABCDS Injury Assessment  Goal: Absence of physical injury  5/28/2025 1219 by Breana Barba RN  Outcome: Progressing  5/27/2025 2318 by Daisy Thomas RN  Outcome: Progressing     Problem: Safety - Adult  Goal: Free from fall injury  5/28/2025 1219 by Breana Barba RN  Outcome: Progressing  5/27/2025 2318 by Daisy Thomas RN  Outcome: Progressing     Problem: Respiratory - Adult  Goal: Achieves optimal ventilation and oxygenation  5/28/2025 1219 by Breana Barba RN  Outcome: Progressing  5/27/2025 2318 by Daisy Thomas RN  Outcome: Progressing  Flowsheets (Taken 5/27/2025 2000)  Achieves optimal ventilation and oxygenation: Assess for changes in respiratory status     Problem: Cardiovascular - Adult  Goal: Maintains optimal cardiac output and hemodynamic stability  5/28/2025 1219 by Breana Barba RN  Outcome: Progressing  5/27/2025 2318 by Daisy Thomas RN  Outcome: Progressing  Flowsheets (Taken 5/27/2025 2000)  Maintains optimal cardiac output and hemodynamic stability: Monitor blood pressure and heart rate  Goal: Absence of cardiac dysrhythmias or at baseline  5/28/2025 1219 by Breana Barba RN  Outcome: Progressing  5/27/2025 2318 by Daisy Thomas RN  Outcome: Progressing  Flowsheets (Taken 5/27/2025 2000)  Absence of cardiac dysrhythmias or at baseline: Monitor cardiac rate and rhythm     Problem: Skin/Tissue Integrity - Adult  Goal: Skin integrity remains intact  5/28/2025 1219 by Breana Barba RN  Outcome: Progressing  5/27/2025 2318 by Daisy Thomas RN  Outcome: Progressing  Flowsheets (Taken 5/27/2025 2000)  Skin

## 2025-05-28 NOTE — CARE COORDINATION
05/28/25 1411   Service Assessment   Patient Orientation Alert and Oriented   Cognition Alert   History Provided By Patient   Primary Caregiver Self   Support Systems Spouse/Significant Other;Children;Family Members;Friends/Neighbors   Patient's Healthcare Decision Maker is: Legal Next of Kin   PCP Verified by CM Yes   Last Visit to PCP Within last 3 months   Prior Functional Level Independent in ADLs/IADLs   Current Functional Level Independent in ADLs/IADLs   Can patient return to prior living arrangement Yes   Ability to make needs known: Good   Family able to assist with home care needs: Yes   Would you like for me to discuss the discharge plan with any other family members/significant others, and if so, who? No   Financial Resources None   Community Resources None   Social/Functional History   Lives With Spouse   Services At/After Discharge   Mode of Transport at Discharge Other (see comment)   Confirm Follow Up Transport Family     CM met f/f with Pt, she confirmed that the information on the face sheet is correct. Pt stated that she lives with her .    No HH, has a cane and is independent with ADL    Send RX to [facility]    Family will transport Pt home    CM dispo: Home NN    Advance Care Planning     General Advance Care Planning (ACP) Conversation    Date of Conversation: 5/28/2025      Healthcare Decision Maker: No healthcare decision makers have been documented.       Content/Action Overview:    Reviewed DNR/DNI and patient elects Full Code (Attempt Resuscitation)

## 2025-05-29 VITALS
RESPIRATION RATE: 14 BRPM | WEIGHT: 151.9 LBS | BODY MASS INDEX: 23.84 KG/M2 | SYSTOLIC BLOOD PRESSURE: 112 MMHG | DIASTOLIC BLOOD PRESSURE: 60 MMHG | HEIGHT: 67 IN | HEART RATE: 86 BPM | OXYGEN SATURATION: 95 % | TEMPERATURE: 97.9 F

## 2025-05-29 LAB
ACT BLD: 176 SEC (ref 74–125)
ALBUMIN SERPL-MCNC: 3 G/DL (ref 3.5–5)
ALBUMIN/GLOB SERPL: 0.9 (ref 1.1–2.2)
ALP SERPL-CCNC: 72 U/L (ref 45–117)
ALT SERPL-CCNC: 20 U/L (ref 12–78)
ANION GAP SERPL CALC-SCNC: 6 MMOL/L (ref 2–12)
AST SERPL W P-5'-P-CCNC: 30 U/L (ref 15–37)
BILIRUB SERPL-MCNC: 0.5 MG/DL (ref 0.2–1)
BUN SERPL-MCNC: 8 MG/DL (ref 6–20)
BUN/CREAT SERPL: 11 (ref 12–20)
CA-I BLD-MCNC: 8.6 MG/DL (ref 8.5–10.1)
CHLORIDE SERPL-SCNC: 114 MMOL/L (ref 97–108)
CO2 SERPL-SCNC: 23 MMOL/L (ref 21–32)
CREAT SERPL-MCNC: 0.7 MG/DL (ref 0.55–1.02)
EKG ATRIAL RATE: 82 BPM
EKG DIAGNOSIS: NORMAL
EKG P AXIS: 50 DEGREES
EKG P-R INTERVAL: 208 MS
EKG Q-T INTERVAL: 436 MS
EKG QRS DURATION: 84 MS
EKG QTC CALCULATION (BAZETT): 509 MS
EKG R AXIS: -10 DEGREES
EKG T AXIS: -55 DEGREES
EKG VENTRICULAR RATE: 82 BPM
ERYTHROCYTE [DISTWIDTH] IN BLOOD BY AUTOMATED COUNT: 18.1 % (ref 11.5–14.5)
GLOBULIN SER CALC-MCNC: 3.2 G/DL (ref 2–4)
GLUCOSE SERPL-MCNC: 108 MG/DL (ref 65–100)
HCT VFR BLD AUTO: 31.7 % (ref 35–47)
HGB BLD-MCNC: 9.5 G/DL (ref 11.5–16)
MAGNESIUM SERPL-MCNC: 2 MG/DL (ref 1.6–2.4)
MCH RBC QN AUTO: 24.3 PG (ref 26–34)
MCHC RBC AUTO-ENTMCNC: 30 G/DL (ref 30–36.5)
MCV RBC AUTO: 81.1 FL (ref 80–99)
NRBC # BLD: 0 K/UL (ref 0–0.01)
NRBC BLD-RTO: 0 PER 100 WBC
PERFORMED BY:: ABNORMAL
PHOSPHATE SERPL-MCNC: 3.5 MG/DL (ref 2.6–4.7)
PLATELET # BLD AUTO: 198 K/UL (ref 150–400)
PMV BLD AUTO: 9.8 FL (ref 8.9–12.9)
POTASSIUM SERPL-SCNC: 3.6 MMOL/L (ref 3.5–5.1)
PROT SERPL-MCNC: 6.2 G/DL (ref 6.4–8.2)
RBC # BLD AUTO: 3.91 M/UL (ref 3.8–5.2)
SODIUM SERPL-SCNC: 143 MMOL/L (ref 136–145)
TROPONIN I SERPL HS-MCNC: 2563 NG/L (ref 0–51)
WBC # BLD AUTO: 7.8 K/UL (ref 3.6–11)

## 2025-05-29 PROCEDURE — 6370000000 HC RX 637 (ALT 250 FOR IP): Performed by: STUDENT IN AN ORGANIZED HEALTH CARE EDUCATION/TRAINING PROGRAM

## 2025-05-29 PROCEDURE — 94761 N-INVAS EAR/PLS OXIMETRY MLT: CPT

## 2025-05-29 PROCEDURE — 84100 ASSAY OF PHOSPHORUS: CPT

## 2025-05-29 PROCEDURE — 83735 ASSAY OF MAGNESIUM: CPT

## 2025-05-29 PROCEDURE — 6370000000 HC RX 637 (ALT 250 FOR IP): Performed by: INTERNAL MEDICINE

## 2025-05-29 PROCEDURE — 6360000002 HC RX W HCPCS: Performed by: STUDENT IN AN ORGANIZED HEALTH CARE EDUCATION/TRAINING PROGRAM

## 2025-05-29 PROCEDURE — 93005 ELECTROCARDIOGRAM TRACING: CPT | Performed by: INTERNAL MEDICINE

## 2025-05-29 PROCEDURE — 80053 COMPREHEN METABOLIC PANEL: CPT

## 2025-05-29 PROCEDURE — 94640 AIRWAY INHALATION TREATMENT: CPT

## 2025-05-29 PROCEDURE — 84484 ASSAY OF TROPONIN QUANT: CPT

## 2025-05-29 PROCEDURE — 36415 COLL VENOUS BLD VENIPUNCTURE: CPT

## 2025-05-29 PROCEDURE — 85027 COMPLETE CBC AUTOMATED: CPT

## 2025-05-29 RX ORDER — NICOTINE 21 MG/24HR
1 PATCH, TRANSDERMAL 24 HOURS TRANSDERMAL DAILY
Qty: 7 PATCH | Refills: 0 | Status: SHIPPED | OUTPATIENT
Start: 2025-05-29 | End: 2025-06-05

## 2025-05-29 RX ORDER — TICAGRELOR 90 MG/1
90 TABLET, FILM COATED ORAL 2 TIMES DAILY
Qty: 60 TABLET | Refills: 0 | Status: SHIPPED | OUTPATIENT
Start: 2025-05-29

## 2025-05-29 RX ORDER — ROSUVASTATIN CALCIUM 20 MG/1
20 TABLET, COATED ORAL NIGHTLY
Qty: 30 TABLET | Refills: 3 | Status: SHIPPED | OUTPATIENT
Start: 2025-05-29

## 2025-05-29 RX ORDER — LOSARTAN POTASSIUM 25 MG/1
25 TABLET ORAL DAILY
Qty: 30 TABLET | Refills: 0 | Status: SHIPPED | OUTPATIENT
Start: 2025-05-30

## 2025-05-29 RX ORDER — CARVEDILOL 6.25 MG/1
6.25 TABLET ORAL 2 TIMES DAILY WITH MEALS
Qty: 60 TABLET | Refills: 0 | Status: SHIPPED | OUTPATIENT
Start: 2025-05-29

## 2025-05-29 RX ADMIN — LOSARTAN POTASSIUM 25 MG: 25 TABLET, FILM COATED ORAL at 10:04

## 2025-05-29 RX ADMIN — CARVEDILOL 6.25 MG: 3.12 TABLET, FILM COATED ORAL at 10:09

## 2025-05-29 RX ADMIN — ENOXAPARIN SODIUM 40 MG: 100 INJECTION SUBCUTANEOUS at 10:05

## 2025-05-29 RX ADMIN — BUDESONIDE AND FORMOTEROL FUMARATE DIHYDRATE 2 PUFF: 160; 4.5 AEROSOL RESPIRATORY (INHALATION) at 09:46

## 2025-05-29 RX ADMIN — ASPIRIN 81 MG: 81 TABLET, CHEWABLE ORAL at 10:04

## 2025-05-29 RX ADMIN — TICAGRELOR 90 MG: 90 TABLET ORAL at 10:04

## 2025-05-29 NOTE — CARE COORDINATION
Transition of Care Plan:    RUR: 13%  Prior Level of Functioning: independent  Disposition: home with spouse  ADRYAN: 5/29/25  If SNF or IPR: Date FOC offered: n/a  Date FOC received: n/a  Accepting facility: n/a  Date authorization started with reference number: n/a  Date authorization received and expires: n/a  Follow up appointments: yes  DME needed: n/a  Transportation at discharge: patient arranged  IM/IMM Medicare/ letter given: previously given  Is patient a [place] and connected with [facility]? N/a   If yes, was [place] transfer form completed and [facility] notified?   Caregiver Contact: patient   Discharge Caregiver contacted prior to discharge? Patient aware  Care Conference needed? N/a  Barriers to discharge: cardio clear?

## 2025-05-29 NOTE — DISCHARGE INSTRUCTIONS
IMPORTANT    People who undergo angioplasty and/or stent placement procedures are prescribed aspirin along with certain medications called anticlotting or antiplatelet medications. These medications include: Plavix (clopidogrel), Effient (prasugrel), and Brilinta (ticagrelor). It is important to take the aspirin and the anticlotting medication that you were prescribed every day in order to reduce the probability that the stent will become filled with blood clot. Angioplasty and/or stent placement procedures are done so that a blocked artery can be opened. If the stent becomes filled with blood clot, the artery becomes blocked off again. This can result in a heart attack, or even death.    It is extremely important to take all the medicines prescribed by your cardiologist exactly as they were prescribed. Failure to take certain medications - particularly aspirin along with Plavix (clopidogrel),  Effient (prasugrel), or Brilinta(ticagrelor) - can result in a heart attack, or even death. Do not miss any doses. It is vital that aspirin along with Plavix, Effient, or Brilinta be taken every single day. If you have any questions or concerns regarding your medications, please consult your cardiologist. He/she is the only person who can adjust or stop these medications.    You must fill the prescription for these medications immediately upon discharge so that you do not miss any doses. If you cannot afford the medication prescribed to you, please let your cardiologist know immediately.  _______________________________________________________________________    Cardiac rehab is a very important way to help you  to feel better and stronger more quickly  and reduce the risks of heart problems in the future.     Cardiac rehabilitation services are provided at:  [facility]  [address] [place] [address], [address]  [place], [place] [address] [phone]    There are also many other locations that provide

## 2025-05-29 NOTE — PROGRESS NOTES
CRITICAL CARE PROGRESS NOTE    Name: Cristian Chandra   : 1950   MRN: [id number]   Date: 2025      Diagnoses/problem list:   STEMI   HTN  Pre-diabetes  Smoking  AAA s/p repair  PAD s/p repair  Tachy-zeinab syndrome s/p PPM    HPI & Hospital Course:   74-year-old female with history of PAD, HTN, AAA s/p repair, CAD (1 stent 7 years ago), MARS (non-compliant with C-pap), PPM came to the hospital today for acute chest pain. Associated left arm pain. Symptoms ongoing for several months. Took nitroglycerine which caused hypotension and she passed out. In ED, EKG with inferior wall ST-elevation. Underwent cardiac cath & had 1 RCA stent was placed. Transferred to ICU afterwards. Daughters at bedside.     24-hour events:   No issues overnight. No more chest pain. Troponin of 7000. Rt groin without pain/swelling.     ROS negative except as otherwise documented.     Assessment and plan:     1. STEMI   - S/p LHC yesterday and 1 RCA stent was placed  - Aspirin and brilinta + coreg  - TTE with normal LVEF and TAPSE  - Pending A1c, lipid profile   - Right groin bleeding precautions      2. Smoking  - Discussed about smoking cessation  - Not interested    - Nicotine patch     3. MARS  - Does not like using nocturnal C-pap    SYSTEMS     Neuro - Awake  Pulm - No issues  [employer] - DAPT  GI - PO diet  Renal - No issues    DAILY CHECKLIST     Code Status: Full code  DVT Prophylaxis: SCDs/Lovenox sq  GI Prophylaxis: Not needed  Feeding: PO diet  ABCDEF Bundle/Checklist Completed: Yes  Disposition: Transfer to medical floor  Social: Discussed with patient     OBJECTIVE:     Blood pressure (!) 115/55, pulse 91, temperature 98.3 °F (36.8 °C), temperature source Oral, resp. rate 24, height 1.702 m (5' 7\"), weight 73.5 kg (162 lb 0.6 oz), SpO2 95%.    Physical Exam  General: Well appearing, in no distress.    Skin: No rash  Head: Normocephalic, atraumatic.  Eyes: Conjunctiva clear, sclera non-icteric, EOM intact.    Neck: Supple, 
1815: Patient transferred with all belongings to room 488 via wheelchair, daughter was with patient.  
4 Eyes Skin Assessment     NAME:  Cristian Chandra  YOB: 1950  MEDICAL RECORD NUMBER:  [id number]    The patient is being assessed for  Admission    I agree that at least one RN has performed a thorough Head to Toe Skin Assessment on the patient. ALL assessment sites listed below have been assessed.      Areas assessed by both nurses:    Head, Face, Ears, Shoulders, Back, Chest, Arms, Elbows, Hands, Sacrum. Buttock, Coccyx, Ischium, Legs. Feet and Heels, and Under Medical Devices         Does the Patient have a Wound? No noted wound(s)       Miguel Prevention initiated by RN: Yes  Wound Care Orders initiated by RN: No    Pressure Injury (Stage 3,4, Unstageable, DTI, NWPT, and Complex wounds) if present, place Wound referral order by RN under : Yes    New Ostomies, if present place, Ostomy referral order under : No     Nurse 1 eSignature: Electronically signed by Libertad Limon RN on 5/29/25 at 1:09 AM EDT    **SHARE this note so that the co-signing nurse can place an eSignature**    Nurse 2 eSignature: Electronically signed by Christopher Lea RN on 5/29/25 at 1:11 AM EDT    
Echo completed. Report to follow.    
IV and tele removed, discharge instructions given and patient discharged home with family.  
Information about the importance of aspirin and antiplatelet compliance and outpatient cardiac rehab will be included with the patient's printed discharge instructions. Patient was also provided with a folder containing this information from the cath lab staff after the procedure.     An outpatient cardiac referral has been made to [facility]. Patient's information was forwarded to this facility. Patient will be contacted by this facility to schedule an initial appointment. This referral information has been included in the patient's discharge instructions. Contact information for cardiac rehab facility was provided as well.  
Received Order for Telemetry     Cristian STRANGE Chandra   1950   [id number]   STEMI (ST elevation myocardial infarction) ([facility]) [I21.3]   Queta Villanueva MD     Tele Box # [id number] placed on patient at  1824 pm  ER Room #   Admitting to Room 488  Transferring Nurse EMEKA  Verified with Primary Nurse NO CALL at  1824 pm   
Spiritual Health History and Assessment/Progress Note  [facility]    Crisis,  ,  ,      Name: Cristian Chandra MRN: [id number]    Age: 74 y.o.     Sex: female   Language: English   Congregation: Unknown   <principal problem not specified>     Date: 5/27/2025            Total Time Calculated: 13 min              Spiritual Assessment began in [facility] CARDIAC CATH LAB            Encounter Overview/Reason: Crisis  Service Provided For: Patient not available    Dary, Belief, Meaning:   Patient unable to assess at this time  Family/Friends No family/friends present      Importance and Influence:  Patient unable to assess at this time  Family/Friends No family/friends present    Community:  Patient Other: unable to assess at this time  Family/Friends No family/friends present    Assessment and Plan of Care:     Patient Interventions include: Other: n/a  Family/Friends Interventions include: No family/friends present    Patient Plan of Care: Spiritual Care available upon further referral  Family/Friends Plan of Care: No family/friends present    I responded to the Code STEMI; no family present and patient unavailable due to receiving care. Further  support available upon request.    Electronically signed by Albert Bragg, Chaplain Resident on 5/27/2025 at 3:52 PM    
involvement.    Keshawn Contreras MD

## 2025-05-29 NOTE — DISCHARGE SUMMARY
Physician Discharge Summary     Patient ID:    Cristian Chandra  [id number]  74 y.o.  1950    Admit date: 5/27/2025    Discharge date : 5/29/2025      Final Diagnoses:   STEMI (ST elevation myocardial infarction) ([facility]) [I21.3]  Anemia  Leukocytosis  Diarrhea  MARS    Reason for Hospitalization: Chest pain        Hospital Course:     74-year-old female with history of PAD, HTN, AAA s/p repair, CAD (1 stent 7 years ago), MARS (non-compliant with C-pap), PPM came to the hospital today for acute chest pain. Associated left arm pain. Symptoms ongoing for several months. Took nitroglycerine which caused hypotension and she passed out. In ED, EKG with inferior wall ST-elevation. Underwent cardiac cath & had 1 RCA stent was placed. Transferred to ICU afterwards .  Echo showed EF 50 to 55%, severe hypokinesis, diastolic dysfunction.  After cath, patient continued to be on aspirin, Coreg, losartan, Brilinta.  Troponin trended down.  Patient reported feeling better.  Cardiology cleared for discharge.  She was given nicotine patch on discharge.  Of note, patient also states that her diarrhea has improved and no more episodes.  No fever or abdominal pain.  She is desiring to go home.  Does not qualify for nocturnal oxygen for MARS.  She will follow-up with PCP.  Otherwise, strict return precautions if worsening chest pain, lethargy, decreased oral intake, fever, worsening diarrhea, abdominal pain, or any other concern.  Patient reports understanding.    She is to follow-up closely with PCP and cardiology.  He is also to follow-up with vascular outpatient.    Discharge Medications:      Medication List        START taking these medications      carvedilol 6.25 MG tablet  Commonly known as: COREG  Take 1 tablet by mouth 2 times daily (with meals)     losartan 25 MG tablet  Commonly known as: COZAAR  Take 1 tablet by mouth daily  Start taking on: May 30, 2025     nicotine 21 MG/24HR  Commonly known as: NICODERM

## 2025-05-31 LAB
BACTERIA SPEC CULT: NORMAL
Lab: NORMAL

## (undated) DEVICE — SYMMETRY® FORCEPS, HOUGH CRURA STAPEDECTOMY, ANGLED 45° LEFT, 5.0 MM BLADE, SERRATED: Brand: SYMMETRY HOUGH

## (undated) DEVICE — SYRINGE ANGIO 20ML WHT POLYCARB VAC PRSS CAP PLUNG FIX M

## (undated) DEVICE — DEVICE INFL W/HEM VLV TORQUE

## (undated) DEVICE — BOWL MED M 16OZ PLAS CAP GRAD

## (undated) DEVICE — CATHETER DIAG 6FR L110CM PIGTAILS CRV STYL PIG145 DXTERITY

## (undated) DEVICE — LULU RADIAL/FEMORAL ANGIOGRAPHY SHEET: Brand: CONVERTORS

## (undated) DEVICE — GUIDEWIRE VASC L260CM DIA0.035IN RAD 3MM J TIP L7CM PTFE

## (undated) DEVICE — 3M™ TEGADERM™ TRANSPARENT FILM DRESSING FRAME STYLE, 1626W, 4 IN X 4-3/4 IN (10 CM X 12 CM), 50/CT 4CT/CASE: Brand: 3M™ TEGADERM™

## (undated) DEVICE — CATHETER DIAG 6FR L100CM LUMN ID0.056IN JL4 CRV 0 SIDE H

## (undated) DEVICE — CATH BLLN ANGIO 2.50X12MM SC EUPHORA RX

## (undated) DEVICE — SYRINGE MED 10ML PUR GAM COMPATIBLE POLYCARB FIX M LUER CONN

## (undated) DEVICE — GLIDESHEATH SLENDER ACCESS KIT: Brand: GLIDESHEATH SLENDER

## (undated) DEVICE — SUTURE VCRL + SZ 4-0 L27IN ABSRB UD PS-2 3/8 CIR REV CUT VCP426H

## (undated) DEVICE — CARDINAL HEALTH LAP SPONGE  4 X 18 IN. PREWASHED X RAY DETECTABLE SOFTPACK: Brand: CARDINAL HEALTH

## (undated) DEVICE — BAG ICE W6.5XL14IN REFILLABLE RECTANG 4 TIE ATTCH W/ CLMP

## (undated) DEVICE — SURGICAL PROCEDURE TRAY CRD CATH 3 PRT

## (undated) DEVICE — PACER PACK: Brand: MEDLINE INDUSTRIES, INC.

## (undated) DEVICE — 3M™ STERI-STRIP™ REINFORCED ADHESIVE SKIN CLOSURES, R1542, 1/4 IN X 1-1/2 IN (6 MM X 38 MM), 6 STRIPS/ENVELOPE: Brand: 3M™ STERI-STRIP™

## (undated) DEVICE — CATHETER GUID AD 6FR L100CM GRN NYL PTFE 3DRC WLLMS TECH

## (undated) DEVICE — BAND COMPR L24CM REG CLR PLAS HEMSTAT EXT HK AND LOOP RETEN

## (undated) DEVICE — PINNACLE INTRODUCER SHEATH: Brand: PINNACLE

## (undated) DEVICE — SYRINGE MED 10ML RED POLYCARB BRL FIX M LUER CONN FLAT GRP

## (undated) DEVICE — GUIDEWIRE VASC L150CM DIA0.035IN TIP L3MM PTFE J CRV FIX

## (undated) DEVICE — BAND RADIAL COMPR ARTERY 24CM -- REG BX/10

## (undated) DEVICE — 3M™ IOBAN™ 2 ANTIMICROBIAL INCISE DRAPE 6650EZ: Brand: IOBAN™ 2

## (undated) DEVICE — LIGHT HANDLE COVER: Brand: UNBRANDED

## (undated) DEVICE — TRAY SURG CUST CRD CATH 3 PRT SSR

## (undated) DEVICE — RUNTHROUGH NS EXTRA FLOPPY PTCA GUIDEWIRE: Brand: RUNTHROUGH

## (undated) DEVICE — SC 3W MP RA OFF PB - PG: Brand: NAMIC

## (undated) DEVICE — SUTURE VCRL + SZ 2-0 L36IN ABSRB UD L36MM CT-1 1/2 CIR VCP945H

## (undated) DEVICE — DRESSING HEMSTAT W4XL4IN 4 PLY WHT IMPREG KAOLIN HYDRPHLC

## (undated) DEVICE — RADIFOCUS GLIDEWIRE: Brand: GLIDEWIRE

## (undated) DEVICE — Z INACTIVE UOM QTY CHANGE USE 2863475 DEVICE INFL 20ML BRL POLYCARB ANALG

## (undated) DEVICE — Device

## (undated) DEVICE — CATHETER ANGIO JL4 0.045 INX5 FRX100 CM THRULUMEN EXPO

## (undated) DEVICE — DRAPE EQUIP C ARM 74X42 IN MOB XR W/ TIE RUBBER BND LF

## (undated) DEVICE — MEDI-TRACE CADENCE ADULT, DEFIBRILLATION ELECTRODE -RTS  (10 PR/PK) - PHYSIO-CONTROL: Brand: MEDI-TRACE CADENCE

## (undated) DEVICE — GUIDEWIRE VASC L80CM OD0.018IN TIP L2CM NIT COR TUNGSTEN

## (undated) DEVICE — PLASMABLADE PS210-030S 3.0S LOCK: Brand: PLASMABLADE™

## (undated) DEVICE — TOOL INSRT 0022IN S STL GWIRE

## (undated) DEVICE — DRESSING FOAM W4XL4IN SIL FACE BORD ADH PD SUP ABSRB COR

## (undated) DEVICE — CATHETER GUID 6FR DIA0.071IN SHFT NYL STD L JR 4 CRV ENH

## (undated) DEVICE — 3M™ TEGADERM™ TRANSPARENT FILM DRESSING FRAME STYLE, 1627, 4 IN X 10 IN (10 CM X 25 CM), 20/CT 4CT/CASE: Brand: 3M™ TEGADERM™

## (undated) DEVICE — TOWEL,OR,DSP,ST,BLUE,DLX,6/PK,12PK/CS: Brand: MEDLINE

## (undated) DEVICE — 3M™ STERI-DRAPE™ SMALL DRAPE WITH ADHESIVE APERTURE 1092 25/BX,4/CS&#X20;: Brand: STERI-DRAPE™

## (undated) DEVICE — TUBING PRESS INJ FLX SH 30IN --

## (undated) DEVICE — PRESSURE TUBING: Brand: TRUWAVE

## (undated) DEVICE — CATHETER ANGIO 5FR L100CM GRY S STL NYL JR4 3 SEG BRAID L